# Patient Record
Sex: FEMALE | Race: BLACK OR AFRICAN AMERICAN | Employment: FULL TIME
[De-identification: names, ages, dates, MRNs, and addresses within clinical notes are randomized per-mention and may not be internally consistent; named-entity substitution may affect disease eponyms.]

---

## 2017-01-09 ENCOUNTER — OFFICE VISIT (OUTPATIENT)
Dept: FAMILY MEDICINE CLINIC | Facility: CLINIC | Age: 22
End: 2017-01-09

## 2017-01-09 VITALS
HEART RATE: 89 BPM | DIASTOLIC BLOOD PRESSURE: 75 MMHG | TEMPERATURE: 98.1 F | SYSTOLIC BLOOD PRESSURE: 128 MMHG | BODY MASS INDEX: 37.1 KG/M2 | WEIGHT: 233.6 LBS

## 2017-01-09 DIAGNOSIS — Z32.00 POSSIBLE PREGNANCY: ICD-10-CM

## 2017-01-09 DIAGNOSIS — G43.719 INTRACTABLE CHRONIC MIGRAINE WITHOUT AURA AND WITHOUT STATUS MIGRAINOSUS: Primary | ICD-10-CM

## 2017-01-09 DIAGNOSIS — Z72.51 UNPROTECTED SEX: ICD-10-CM

## 2017-01-09 PROCEDURE — 99213 OFFICE O/P EST LOW 20 MIN: CPT | Performed by: STUDENT IN AN ORGANIZED HEALTH CARE EDUCATION/TRAINING PROGRAM

## 2017-01-09 RX ORDER — SUMATRIPTAN 25 MG/1
25 TABLET, FILM COATED ORAL
Qty: 15 TABLET | Refills: 1 | Status: SHIPPED | OUTPATIENT
Start: 2017-01-09 | End: 2017-11-07

## 2017-01-09 ASSESSMENT — ENCOUNTER SYMPTOMS
SINUS PRESSURE: 0
NAUSEA: 1
ABDOMINAL PAIN: 0
EYE PAIN: 0
FACIAL SWELLING: 0
RHINORRHEA: 0
VOMITING: 0
SHORTNESS OF BREATH: 0
WHEEZING: 0
PHOTOPHOBIA: 0

## 2017-06-07 ENCOUNTER — HOSPITAL ENCOUNTER (EMERGENCY)
Age: 22
Discharge: HOME | End: 2017-06-07
Attending: EMERGENCY MEDICINE
Payer: COMMERCIAL

## 2017-06-07 VITALS
OXYGEN SATURATION: 99 % | WEIGHT: 230 LBS | RESPIRATION RATE: 15 BRPM | DIASTOLIC BLOOD PRESSURE: 65 MMHG | SYSTOLIC BLOOD PRESSURE: 107 MMHG | HEIGHT: 66 IN | BODY MASS INDEX: 36.96 KG/M2 | TEMPERATURE: 98.2 F | HEART RATE: 98 BPM

## 2017-06-07 DIAGNOSIS — B96.89 BACTERIAL VAGINOSIS: ICD-10-CM

## 2017-06-07 DIAGNOSIS — R10.2 SUPRAPUBIC PAIN: Primary | ICD-10-CM

## 2017-06-07 DIAGNOSIS — N76.0 BACTERIAL VAGINOSIS: ICD-10-CM

## 2017-06-07 LAB
-: NORMAL
ABSOLUTE EOS #: 0 K/UL (ref 0–0.4)
ABSOLUTE LYMPH #: 1.5 K/UL (ref 1–4.8)
ABSOLUTE MONO #: 0.6 K/UL (ref 0.1–1.4)
AMORPHOUS: NORMAL
ANION GAP SERPL CALCULATED.3IONS-SCNC: 12 MMOL/L (ref 9–17)
BACTERIA: NORMAL
BASOPHILS # BLD: 1 %
BASOPHILS ABSOLUTE: 0 K/UL (ref 0–0.2)
BILIRUBIN URINE: NEGATIVE
BUN BLDV-MCNC: 12 MG/DL (ref 6–20)
BUN/CREAT BLD: ABNORMAL (ref 9–20)
CALCIUM SERPL-MCNC: 8.9 MG/DL (ref 8.6–10.4)
CASTS UA: NORMAL /LPF (ref 0–8)
CHLORIDE BLD-SCNC: 101 MMOL/L (ref 98–107)
CO2: 22 MMOL/L (ref 20–31)
COLOR: YELLOW
COMMENT UA: ABNORMAL
CREAT SERPL-MCNC: 0.79 MG/DL (ref 0.5–0.9)
CRYSTALS, UA: NORMAL /HPF
DIFFERENTIAL TYPE: ABNORMAL
DIRECT EXAM: ABNORMAL
EOSINOPHILS RELATIVE PERCENT: 1 %
EPITHELIAL CELLS UA: NORMAL /HPF (ref 0–5)
GFR AFRICAN AMERICAN: >60 ML/MIN
GFR NON-AFRICAN AMERICAN: >60 ML/MIN
GFR SERPL CREATININE-BSD FRML MDRD: ABNORMAL ML/MIN/{1.73_M2}
GFR SERPL CREATININE-BSD FRML MDRD: ABNORMAL ML/MIN/{1.73_M2}
GLUCOSE BLD-MCNC: 104 MG/DL (ref 70–99)
GLUCOSE URINE: NEGATIVE
HCG QUALITATIVE: NEGATIVE
HCT VFR BLD CALC: 38.1 % (ref 36–46)
HEMOGLOBIN: 12.5 G/DL (ref 12–16)
KETONES, URINE: ABNORMAL
LEUKOCYTE ESTERASE, URINE: ABNORMAL
LYMPHOCYTES # BLD: 24 %
Lab: ABNORMAL
MCH RBC QN AUTO: 25.3 PG (ref 26–34)
MCHC RBC AUTO-ENTMCNC: 32.7 G/DL (ref 31–37)
MCV RBC AUTO: 77.2 FL (ref 80–100)
MONOCYTES # BLD: 10 %
MUCUS: NORMAL
NITRITE, URINE: NEGATIVE
OTHER OBSERVATIONS UA: NORMAL
PDW BLD-RTO: 14.4 % (ref 12.5–15.4)
PH UA: 7 (ref 5–8)
PLATELET # BLD: 239 K/UL (ref 140–450)
PLATELET ESTIMATE: ABNORMAL
PMV BLD AUTO: 8.8 FL (ref 6–12)
POTASSIUM SERPL-SCNC: 3.9 MMOL/L (ref 3.7–5.3)
PROTEIN UA: NEGATIVE
RBC # BLD: 4.94 M/UL (ref 4–5.2)
RBC # BLD: ABNORMAL 10*6/UL
RBC UA: NORMAL /HPF (ref 0–4)
RENAL EPITHELIAL, UA: NORMAL /HPF
SEG NEUTROPHILS: 64 %
SEGMENTED NEUTROPHILS ABSOLUTE COUNT: 4 K/UL (ref 1.8–7.7)
SODIUM BLD-SCNC: 135 MMOL/L (ref 135–144)
SPECIFIC GRAVITY UA: 1.03 (ref 1–1.03)
SPECIMEN DESCRIPTION: ABNORMAL
STATUS: ABNORMAL
TRICHOMONAS: NORMAL
TURBIDITY: CLEAR
URINE HGB: NEGATIVE
UROBILINOGEN, URINE: NORMAL
WBC # BLD: 6.2 K/UL (ref 4.5–13.5)
WBC # BLD: ABNORMAL 10*3/UL
WBC UA: NORMAL /HPF (ref 0–5)
YEAST: NORMAL

## 2017-06-07 PROCEDURE — 99284 EMERGENCY DEPT VISIT MOD MDM: CPT

## 2017-06-07 PROCEDURE — 81001 URINALYSIS AUTO W/SCOPE: CPT

## 2017-06-07 PROCEDURE — 84703 CHORIONIC GONADOTROPIN ASSAY: CPT

## 2017-06-07 PROCEDURE — 85025 COMPLETE CBC W/AUTO DIFF WBC: CPT

## 2017-06-07 PROCEDURE — 2580000003 HC RX 258: Performed by: EMERGENCY MEDICINE

## 2017-06-07 PROCEDURE — 6360000002 HC RX W HCPCS: Performed by: EMERGENCY MEDICINE

## 2017-06-07 PROCEDURE — 96374 THER/PROPH/DIAG INJ IV PUSH: CPT

## 2017-06-07 PROCEDURE — 87591 N.GONORRHOEAE DNA AMP PROB: CPT

## 2017-06-07 PROCEDURE — 6370000000 HC RX 637 (ALT 250 FOR IP): Performed by: EMERGENCY MEDICINE

## 2017-06-07 PROCEDURE — 87491 CHLMYD TRACH DNA AMP PROBE: CPT

## 2017-06-07 PROCEDURE — 87510 GARDNER VAG DNA DIR PROBE: CPT

## 2017-06-07 PROCEDURE — 87480 CANDIDA DNA DIR PROBE: CPT

## 2017-06-07 PROCEDURE — 80048 BASIC METABOLIC PNL TOTAL CA: CPT

## 2017-06-07 PROCEDURE — 96375 TX/PRO/DX INJ NEW DRUG ADDON: CPT

## 2017-06-07 PROCEDURE — 87660 TRICHOMONAS VAGIN DIR PROBE: CPT

## 2017-06-07 RX ORDER — ONDANSETRON 2 MG/ML
4 INJECTION INTRAMUSCULAR; INTRAVENOUS ONCE
Status: COMPLETED | OUTPATIENT
Start: 2017-06-07 | End: 2017-06-07

## 2017-06-07 RX ORDER — METRONIDAZOLE 500 MG/1
500 TABLET ORAL ONCE
Status: COMPLETED | OUTPATIENT
Start: 2017-06-07 | End: 2017-06-07

## 2017-06-07 RX ORDER — MORPHINE SULFATE 4 MG/ML
4 INJECTION, SOLUTION INTRAMUSCULAR; INTRAVENOUS ONCE
Status: COMPLETED | OUTPATIENT
Start: 2017-06-07 | End: 2017-06-07

## 2017-06-07 RX ORDER — METRONIDAZOLE 500 MG/1
500 TABLET ORAL 2 TIMES DAILY
Qty: 14 TABLET | Refills: 0 | Status: SHIPPED | OUTPATIENT
Start: 2017-06-07 | End: 2017-06-14

## 2017-06-07 RX ORDER — 0.9 % SODIUM CHLORIDE 0.9 %
1000 INTRAVENOUS SOLUTION INTRAVENOUS ONCE
Status: COMPLETED | OUTPATIENT
Start: 2017-06-07 | End: 2017-06-07

## 2017-06-07 RX ADMIN — METRONIDAZOLE 500 MG: 500 TABLET ORAL at 15:23

## 2017-06-07 RX ADMIN — SODIUM CHLORIDE 1000 ML: 9 INJECTION, SOLUTION INTRAVENOUS at 13:10

## 2017-06-07 RX ADMIN — MORPHINE SULFATE 4 MG: 4 INJECTION, SOLUTION INTRAMUSCULAR; INTRAVENOUS at 13:10

## 2017-06-07 RX ADMIN — ONDANSETRON 4 MG: 2 INJECTION INTRAMUSCULAR; INTRAVENOUS at 13:10

## 2017-06-07 ASSESSMENT — ENCOUNTER SYMPTOMS
COUGH: 0
RHINORRHEA: 0
SHORTNESS OF BREATH: 0
ABDOMINAL PAIN: 1
BLOOD IN STOOL: 0
VOMITING: 0
DIARRHEA: 0
CONSTIPATION: 0
SORE THROAT: 0
BACK PAIN: 0
SINUS PRESSURE: 0
TROUBLE SWALLOWING: 0
PHOTOPHOBIA: 0
NAUSEA: 0

## 2017-06-07 ASSESSMENT — PAIN SCALES - GENERAL
PAINLEVEL_OUTOF10: 9
PAINLEVEL_OUTOF10: 7

## 2017-06-07 ASSESSMENT — PAIN DESCRIPTION - PAIN TYPE: TYPE: ACUTE PAIN

## 2017-06-07 ASSESSMENT — PAIN DESCRIPTION - ORIENTATION: ORIENTATION: LOWER;MID

## 2017-06-07 ASSESSMENT — PAIN DESCRIPTION - DESCRIPTORS: DESCRIPTORS: CRAMPING

## 2017-06-07 ASSESSMENT — PAIN DESCRIPTION - LOCATION: LOCATION: ABDOMEN

## 2017-06-07 ASSESSMENT — PAIN DESCRIPTION - FREQUENCY: FREQUENCY: CONTINUOUS

## 2017-06-08 LAB
C TRACH DNA GENITAL QL NAA+PROBE: NEGATIVE
N. GONORRHOEAE DNA: NEGATIVE

## 2017-06-25 ENCOUNTER — HOSPITAL ENCOUNTER (EMERGENCY)
Age: 22
Discharge: HOME | End: 2017-06-25
Attending: EMERGENCY MEDICINE
Payer: COMMERCIAL

## 2017-06-25 VITALS
HEART RATE: 91 BPM | OXYGEN SATURATION: 99 % | TEMPERATURE: 98.1 F | SYSTOLIC BLOOD PRESSURE: 121 MMHG | DIASTOLIC BLOOD PRESSURE: 65 MMHG | RESPIRATION RATE: 18 BRPM

## 2017-06-25 DIAGNOSIS — N93.9 VAGINAL BLEEDING: Primary | ICD-10-CM

## 2017-06-25 LAB
ABSOLUTE EOS #: 0 K/UL (ref 0–0.4)
ABSOLUTE LYMPH #: 1.6 K/UL (ref 1–4.8)
ABSOLUTE MONO #: 0.5 K/UL (ref 0.1–1.4)
ANION GAP SERPL CALCULATED.3IONS-SCNC: 10 MMOL/L (ref 9–17)
BASOPHILS # BLD: 1 %
BASOPHILS ABSOLUTE: 0 K/UL (ref 0–0.2)
BILIRUBIN URINE: NEGATIVE
BUN BLDV-MCNC: 11 MG/DL (ref 6–20)
BUN/CREAT BLD: NORMAL (ref 9–20)
CALCIUM SERPL-MCNC: 8.8 MG/DL (ref 8.6–10.4)
CHLORIDE BLD-SCNC: 104 MMOL/L (ref 98–107)
CO2: 24 MMOL/L (ref 20–31)
COLOR: YELLOW
COMMENT UA: NORMAL
CREAT SERPL-MCNC: 0.56 MG/DL (ref 0.5–0.9)
DIFFERENTIAL TYPE: ABNORMAL
DIRECT EXAM: NORMAL
EOSINOPHILS RELATIVE PERCENT: 1 %
GFR AFRICAN AMERICAN: >60 ML/MIN
GFR NON-AFRICAN AMERICAN: >60 ML/MIN
GFR SERPL CREATININE-BSD FRML MDRD: NORMAL ML/MIN/{1.73_M2}
GFR SERPL CREATININE-BSD FRML MDRD: NORMAL ML/MIN/{1.73_M2}
GLUCOSE BLD-MCNC: 81 MG/DL (ref 70–99)
GLUCOSE URINE: NEGATIVE
HCG QUALITATIVE: NEGATIVE
HCT VFR BLD CALC: 36.2 % (ref 36–46)
HEMOGLOBIN: 11.9 G/DL (ref 12–16)
KETONES, URINE: NEGATIVE
LEUKOCYTE ESTERASE, URINE: NEGATIVE
LYMPHOCYTES # BLD: 28 %
Lab: NORMAL
MCH RBC QN AUTO: 25.4 PG (ref 26–34)
MCHC RBC AUTO-ENTMCNC: 32.9 G/DL (ref 31–37)
MCV RBC AUTO: 77.3 FL (ref 80–100)
MONOCYTES # BLD: 10 %
NITRITE, URINE: NEGATIVE
PDW BLD-RTO: 15.1 % (ref 12.5–15.4)
PH UA: 7 (ref 5–8)
PLATELET # BLD: 254 K/UL (ref 140–450)
PLATELET ESTIMATE: ABNORMAL
PMV BLD AUTO: 8.1 FL (ref 6–12)
POTASSIUM SERPL-SCNC: 4 MMOL/L (ref 3.7–5.3)
PROTEIN UA: NEGATIVE
RBC # BLD: 4.68 M/UL (ref 4–5.2)
RBC # BLD: ABNORMAL 10*6/UL
SEG NEUTROPHILS: 60 %
SEGMENTED NEUTROPHILS ABSOLUTE COUNT: 3.5 K/UL (ref 1.8–7.7)
SODIUM BLD-SCNC: 138 MMOL/L (ref 135–144)
SPECIFIC GRAVITY UA: 1.03 (ref 1–1.03)
SPECIMEN DESCRIPTION: NORMAL
STATUS: NORMAL
TURBIDITY: CLEAR
URINE HGB: NEGATIVE
UROBILINOGEN, URINE: NORMAL
WBC # BLD: 5.7 K/UL (ref 4.5–13.5)
WBC # BLD: ABNORMAL 10*3/UL

## 2017-06-25 PROCEDURE — 80048 BASIC METABOLIC PNL TOTAL CA: CPT

## 2017-06-25 PROCEDURE — 87510 GARDNER VAG DNA DIR PROBE: CPT

## 2017-06-25 PROCEDURE — 81003 URINALYSIS AUTO W/O SCOPE: CPT

## 2017-06-25 PROCEDURE — 87480 CANDIDA DNA DIR PROBE: CPT

## 2017-06-25 PROCEDURE — 87591 N.GONORRHOEAE DNA AMP PROB: CPT

## 2017-06-25 PROCEDURE — 87491 CHLMYD TRACH DNA AMP PROBE: CPT

## 2017-06-25 PROCEDURE — 99284 EMERGENCY DEPT VISIT MOD MDM: CPT

## 2017-06-25 PROCEDURE — 87660 TRICHOMONAS VAGIN DIR PROBE: CPT

## 2017-06-25 PROCEDURE — 85025 COMPLETE CBC W/AUTO DIFF WBC: CPT

## 2017-06-25 PROCEDURE — 84703 CHORIONIC GONADOTROPIN ASSAY: CPT

## 2017-06-25 ASSESSMENT — ENCOUNTER SYMPTOMS
SHORTNESS OF BREATH: 0
NAUSEA: 0
DIARRHEA: 0
SORE THROAT: 0
VOMITING: 0
CONSTIPATION: 0
COUGH: 0
ABDOMINAL PAIN: 1
BACK PAIN: 0

## 2017-06-26 LAB
C TRACH DNA GENITAL QL NAA+PROBE: NEGATIVE
N. GONORRHOEAE DNA: NEGATIVE

## 2017-08-15 ENCOUNTER — HOSPITAL ENCOUNTER (EMERGENCY)
Age: 22
Discharge: HOME | End: 2017-08-15
Attending: EMERGENCY MEDICINE
Payer: COMMERCIAL

## 2017-08-15 ENCOUNTER — APPOINTMENT (OUTPATIENT)
Dept: GENERAL RADIOLOGY | Age: 22
End: 2017-08-15
Payer: COMMERCIAL

## 2017-08-15 VITALS
OXYGEN SATURATION: 100 % | TEMPERATURE: 98.8 F | RESPIRATION RATE: 14 BRPM | SYSTOLIC BLOOD PRESSURE: 117 MMHG | HEART RATE: 91 BPM | DIASTOLIC BLOOD PRESSURE: 79 MMHG

## 2017-08-15 DIAGNOSIS — N93.8 DYSFUNCTIONAL UTERINE BLEEDING: ICD-10-CM

## 2017-08-15 DIAGNOSIS — M25.562 ACUTE PAIN OF LEFT KNEE: Primary | ICD-10-CM

## 2017-08-15 DIAGNOSIS — N30.00 ACUTE CYSTITIS WITHOUT HEMATURIA: ICD-10-CM

## 2017-08-15 LAB
-: ABNORMAL
ABSOLUTE EOS #: 0 K/UL (ref 0–0.4)
ABSOLUTE LYMPH #: 1.1 K/UL (ref 1–4.8)
ABSOLUTE MONO #: 0.5 K/UL (ref 0.1–1.4)
AMORPHOUS: ABNORMAL
ANION GAP SERPL CALCULATED.3IONS-SCNC: 13 MMOL/L (ref 9–17)
BACTERIA: ABNORMAL
BASOPHILS # BLD: 0 %
BASOPHILS ABSOLUTE: 0 K/UL (ref 0–0.2)
BILIRUBIN URINE: ABNORMAL
BUN BLDV-MCNC: 12 MG/DL (ref 6–20)
BUN/CREAT BLD: ABNORMAL (ref 9–20)
CALCIUM SERPL-MCNC: 8.5 MG/DL (ref 8.6–10.4)
CASTS UA: ABNORMAL /LPF (ref 0–8)
CHLORIDE BLD-SCNC: 104 MMOL/L (ref 98–107)
CO2: 22 MMOL/L (ref 20–31)
COLOR: ABNORMAL
CREAT SERPL-MCNC: 0.56 MG/DL (ref 0.5–0.9)
CRYSTALS, UA: ABNORMAL /HPF
DIFFERENTIAL TYPE: ABNORMAL
DIRECT EXAM: NORMAL
EOSINOPHILS RELATIVE PERCENT: 1 %
EPITHELIAL CELLS UA: ABNORMAL /HPF (ref 0–5)
GFR AFRICAN AMERICAN: >60 ML/MIN
GFR NON-AFRICAN AMERICAN: >60 ML/MIN
GFR SERPL CREATININE-BSD FRML MDRD: ABNORMAL ML/MIN/{1.73_M2}
GFR SERPL CREATININE-BSD FRML MDRD: ABNORMAL ML/MIN/{1.73_M2}
GLUCOSE BLD-MCNC: 116 MG/DL (ref 70–99)
GLUCOSE URINE: NEGATIVE
HCG(URINE) PREGNANCY TEST: NEGATIVE
HCT VFR BLD CALC: 36.5 % (ref 36–46)
HEMOGLOBIN: 12.1 G/DL (ref 12–16)
KETONES, URINE: NEGATIVE
LEUKOCYTE ESTERASE, URINE: ABNORMAL
LYMPHOCYTES # BLD: 18 %
Lab: NORMAL
MCH RBC QN AUTO: 25.5 PG (ref 26–34)
MCHC RBC AUTO-ENTMCNC: 33 G/DL (ref 31–37)
MCV RBC AUTO: 77 FL (ref 80–100)
MONOCYTES # BLD: 8 %
MUCUS: ABNORMAL
NITRITE, URINE: POSITIVE
OTHER OBSERVATIONS UA: ABNORMAL
PDW BLD-RTO: 14.5 % (ref 12.5–15.4)
PH UA: 5 (ref 5–8)
PLATELET # BLD: 253 K/UL (ref 140–450)
PLATELET ESTIMATE: ABNORMAL
PMV BLD AUTO: 8.2 FL (ref 6–12)
POTASSIUM SERPL-SCNC: 3.9 MMOL/L (ref 3.7–5.3)
PROTEIN UA: ABNORMAL
RBC # BLD: 4.74 M/UL (ref 4–5.2)
RBC # BLD: ABNORMAL 10*6/UL
RBC UA: ABNORMAL /HPF (ref 0–4)
RENAL EPITHELIAL, UA: ABNORMAL /HPF
SEG NEUTROPHILS: 73 %
SEGMENTED NEUTROPHILS ABSOLUTE COUNT: 4.5 K/UL (ref 1.8–7.7)
SODIUM BLD-SCNC: 139 MMOL/L (ref 135–144)
SPECIFIC GRAVITY UA: 1.03 (ref 1–1.03)
SPECIMEN DESCRIPTION: NORMAL
STATUS: NORMAL
TRICHOMONAS: ABNORMAL
TURBIDITY: ABNORMAL
URINE HGB: ABNORMAL
UROBILINOGEN, URINE: NORMAL
WBC # BLD: 6.1 K/UL (ref 4.5–13.5)
WBC # BLD: ABNORMAL 10*3/UL
WBC UA: ABNORMAL /HPF (ref 0–5)
YEAST: ABNORMAL

## 2017-08-15 PROCEDURE — 85025 COMPLETE CBC W/AUTO DIFF WBC: CPT

## 2017-08-15 PROCEDURE — G0383 LEV 4 HOSP TYPE B ED VISIT: HCPCS

## 2017-08-15 PROCEDURE — 87510 GARDNER VAG DNA DIR PROBE: CPT

## 2017-08-15 PROCEDURE — 87660 TRICHOMONAS VAGIN DIR PROBE: CPT

## 2017-08-15 PROCEDURE — 87480 CANDIDA DNA DIR PROBE: CPT

## 2017-08-15 PROCEDURE — 87491 CHLMYD TRACH DNA AMP PROBE: CPT

## 2017-08-15 PROCEDURE — 73562 X-RAY EXAM OF KNEE 3: CPT

## 2017-08-15 PROCEDURE — 84703 CHORIONIC GONADOTROPIN ASSAY: CPT

## 2017-08-15 PROCEDURE — 81001 URINALYSIS AUTO W/SCOPE: CPT

## 2017-08-15 PROCEDURE — 87591 N.GONORRHOEAE DNA AMP PROB: CPT

## 2017-08-15 PROCEDURE — 80048 BASIC METABOLIC PNL TOTAL CA: CPT

## 2017-08-15 RX ORDER — CEPHALEXIN 500 MG/1
500 CAPSULE ORAL 3 TIMES DAILY
Qty: 21 CAPSULE | Refills: 0 | Status: SHIPPED | OUTPATIENT
Start: 2017-08-15 | End: 2017-08-22

## 2017-08-15 RX ORDER — ACETAMINOPHEN 325 MG/1
650 TABLET ORAL ONCE
Status: DISCONTINUED | OUTPATIENT
Start: 2017-08-15 | End: 2017-08-15 | Stop reason: HOSPADM

## 2017-08-15 ASSESSMENT — PAIN DESCRIPTION - DESCRIPTORS: DESCRIPTORS: ACHING

## 2017-08-15 ASSESSMENT — ENCOUNTER SYMPTOMS
RESPIRATORY NEGATIVE: 1
ALLERGIC/IMMUNOLOGIC NEGATIVE: 1
EYES NEGATIVE: 1
GASTROINTESTINAL NEGATIVE: 1

## 2017-08-15 ASSESSMENT — PAIN DESCRIPTION - ORIENTATION: ORIENTATION: LEFT

## 2017-08-15 ASSESSMENT — PAIN SCALES - GENERAL: PAINLEVEL_OUTOF10: 6

## 2017-08-15 ASSESSMENT — PAIN DESCRIPTION - LOCATION: LOCATION: KNEE

## 2017-08-16 LAB
C TRACH DNA GENITAL QL NAA+PROBE: NEGATIVE
N. GONORRHOEAE DNA: NEGATIVE

## 2017-11-07 ENCOUNTER — OFFICE VISIT (OUTPATIENT)
Dept: FAMILY MEDICINE CLINIC | Age: 22
End: 2017-11-07
Payer: COMMERCIAL

## 2017-11-07 VITALS
WEIGHT: 243.8 LBS | SYSTOLIC BLOOD PRESSURE: 114 MMHG | DIASTOLIC BLOOD PRESSURE: 70 MMHG | TEMPERATURE: 97.9 F | BODY MASS INDEX: 39.35 KG/M2 | HEART RATE: 90 BPM

## 2017-11-07 DIAGNOSIS — I80.01 SUPERFICIAL PHLEBITIS AND THROMBOPHLEBITIS OF RIGHT LOWER EXTREMITY: Primary | ICD-10-CM

## 2017-11-07 PROCEDURE — 99213 OFFICE O/P EST LOW 20 MIN: CPT | Performed by: STUDENT IN AN ORGANIZED HEALTH CARE EDUCATION/TRAINING PROGRAM

## 2017-11-07 PROCEDURE — G8428 CUR MEDS NOT DOCUMENT: HCPCS | Performed by: STUDENT IN AN ORGANIZED HEALTH CARE EDUCATION/TRAINING PROGRAM

## 2017-11-07 PROCEDURE — G8484 FLU IMMUNIZE NO ADMIN: HCPCS | Performed by: STUDENT IN AN ORGANIZED HEALTH CARE EDUCATION/TRAINING PROGRAM

## 2017-11-07 PROCEDURE — 1036F TOBACCO NON-USER: CPT | Performed by: STUDENT IN AN ORGANIZED HEALTH CARE EDUCATION/TRAINING PROGRAM

## 2017-11-07 PROCEDURE — G8417 CALC BMI ABV UP PARAM F/U: HCPCS | Performed by: STUDENT IN AN ORGANIZED HEALTH CARE EDUCATION/TRAINING PROGRAM

## 2017-11-07 RX ORDER — MELOXICAM 15 MG/1
15 TABLET ORAL DAILY
Qty: 30 TABLET | Refills: 3 | Status: SHIPPED | OUTPATIENT
Start: 2017-11-07 | End: 2022-10-21 | Stop reason: ALTCHOICE

## 2017-11-07 ASSESSMENT — ENCOUNTER SYMPTOMS
ABDOMINAL PAIN: 0
SHORTNESS OF BREATH: 0

## 2017-11-07 ASSESSMENT — PATIENT HEALTH QUESTIONNAIRE - PHQ9
SUM OF ALL RESPONSES TO PHQ9 QUESTIONS 1 & 2: 0
SUM OF ALL RESPONSES TO PHQ QUESTIONS 1-9: 0
2. FEELING DOWN, DEPRESSED OR HOPELESS: 0
1. LITTLE INTEREST OR PLEASURE IN DOING THINGS: 0

## 2017-11-07 NOTE — PROGRESS NOTES
08/15/2017    CHOL 134 07/08/2016    TRIG 57 07/08/2016    HDL 29 (L) 07/08/2016    ALT 20 12/21/2016    AST 23 12/21/2016     08/15/2017    K 3.9 08/15/2017     08/15/2017    CREATININE 0.56 08/15/2017    BUN 12 08/15/2017    CO2 22 08/15/2017    TSH 3.74 06/24/2016    LABA1C 5.5 06/24/2016     Lab Results   Component Value Date    CALCIUM 8.5 (L) 08/15/2017     Lab Results   Component Value Date    LDLCHOLESTEROL 94 07/08/2016       Assessment and Plan:    1. Superficial phlebitis and thrombophlebitis of right lower extremity  - acute superficial vein thrombosis. Right short saphenous vein  - Venous doppler in care everywhere  - Information printed  - No need for a/c as very superficial  - NSAID, leg elevation, heat compress, information printed for patient  - If no improvement in symptoms consider vascular referral  - meloxicam (MOBIC) 15 MG tablet; Take 1 tablet by mouth daily  Dispense: 30 tablet; Refill: 3    Requested Prescriptions     Signed Prescriptions Disp Refills    meloxicam (MOBIC) 15 MG tablet 30 tablet 3     Sig: Take 1 tablet by mouth daily       Medications Discontinued During This Encounter   Medication Reason    SUMAtriptan (IMITREX) 25 MG tablet Patient Choice    ondansetron (ZOFRAN) 4 MG tablet Patient Choice    acetaminophen (TYLENOL) 325 MG tablet Patient Choice    meloxicam (MOBIC) 15 MG tablet Reorder       Return if symptoms worsen or fail to improve. Marcelino Russo received counseling on the following healthy behaviors: nutrition, exercise and medication adherence  Reviewed prior labs and health maintenance. Continue current medications, diet and exercise. Discussed use, benefit, and side effects of prescribed medications. Barriers to medication compliance addressed. Patient given educational materials - see patient instructions. All patient questions answered. Patient voiced understanding.

## 2017-11-07 NOTE — PATIENT INSTRUCTIONS
Visit Information    Have you changed or started any medications since your last visit including any over-the-counter medicines, vitamins, or herbal medicines? no   Have you stopped taking any of your medications? Is so, why? -  no  Are you having any side effects from any of your medications? - no    Have you seen any other physician or provider since your last visit?  no   Have you had any other diagnostic tests since your last visit?  no   Have you been seen in the emergency room and/or had an admission in a hospital since we last saw you?  yes - [facility]   Have you had your routine dental cleaning in the past 6 months?  no     Do you have an active MyChart account? If no, what is the barrier? Yes    Patient Care Team:  Lea Smith MD as PCP - General    Medical History Review  Past Medical, Family, and Social History reviewed and does not contribute to the patient presenting condition    Health Maintenance   Topic Date Due    Meningococcal (MCV) Vaccine Age 0-21 Years (1 of 1) 05/16/2012    Cervical cancer screen  05/16/2017    Flu vaccine (1) 09/01/2017    Chlamydia screen  08/15/2018    DTaP/Tdap/Td vaccine (2 - Td) 07/08/2026    Pneumococcal med risk  Completed    HIV screen  Completed             Thank you for letting us take care of you today. We hope all your questions were addressed. If a question was overlooked or something else comes to mind after you return home, please contact a member of your Care Team listed below. Please make sure you have a routine office visit set up to follow-up on [address].      Your Care Team at [place] [address] is Team #3  Delmi Deutsch MD (Faculty)  Lea Smith MD (Faculty  Lan Goyal MD (Resident)  Rebeka Ortega MD (Resident)  Arcadio Rob MD (Resident)  Brennan Underwood MD (Resident)  Sanna Whitlock, KANDACE Rodarte, ALETAA  Phi Aver ([facility])  Laxmi Bautista RN, ([address] [facility])  Colin Stevens, Ph.D.,

## 2017-11-15 PROBLEM — I80.01 SUPERFICIAL PHLEBITIS AND THROMBOPHLEBITIS OF RIGHT LOWER EXTREMITY: Status: ACTIVE | Noted: 2017-11-15

## 2019-01-30 ENCOUNTER — HOSPITAL ENCOUNTER (OUTPATIENT)
Age: 24
Setting detail: SPECIMEN
Discharge: HOME | End: 2019-01-30
Payer: COMMERCIAL

## 2019-01-30 ENCOUNTER — OFFICE VISIT (OUTPATIENT)
Dept: FAMILY MEDICINE CLINIC | Age: 24
End: 2019-01-30
Payer: COMMERCIAL

## 2019-01-30 VITALS
TEMPERATURE: 98 F | DIASTOLIC BLOOD PRESSURE: 76 MMHG | HEART RATE: 89 BPM | HEIGHT: 66 IN | SYSTOLIC BLOOD PRESSURE: 109 MMHG | BODY MASS INDEX: 40.18 KG/M2 | WEIGHT: 250 LBS

## 2019-01-30 DIAGNOSIS — Z23 NEED FOR VACCINATION: ICD-10-CM

## 2019-01-30 DIAGNOSIS — R53.83 FATIGUE, UNSPECIFIED TYPE: ICD-10-CM

## 2019-01-30 DIAGNOSIS — R63.5 WEIGHT GAIN: ICD-10-CM

## 2019-01-30 DIAGNOSIS — G47.19 EXCESSIVE DAYTIME SLEEPINESS: ICD-10-CM

## 2019-01-30 DIAGNOSIS — R53.83 FATIGUE, UNSPECIFIED TYPE: Primary | ICD-10-CM

## 2019-01-30 LAB
ABSOLUTE EOS #: 0.1 K/UL (ref 0–0.44)
ABSOLUTE IMMATURE GRANULOCYTE: <0.03 K/UL (ref 0–0.3)
ABSOLUTE LYMPH #: 1.5 K/UL (ref 1.1–3.7)
ABSOLUTE MONO #: 0.55 K/UL (ref 0.1–1.2)
ANION GAP SERPL CALCULATED.3IONS-SCNC: 11 MMOL/L (ref 9–17)
BASOPHILS # BLD: 0 % (ref 0–2)
BASOPHILS ABSOLUTE: 0.03 K/UL (ref 0–0.2)
BUN BLDV-MCNC: 12 MG/DL (ref 6–20)
BUN/CREAT BLD: ABNORMAL (ref 9–20)
CALCIUM SERPL-MCNC: 8.9 MG/DL (ref 8.6–10.4)
CHLORIDE BLD-SCNC: 109 MMOL/L (ref 98–107)
CO2: 19 MMOL/L (ref 20–31)
CREAT SERPL-MCNC: 0.59 MG/DL (ref 0.5–0.9)
DIFFERENTIAL TYPE: ABNORMAL
EOSINOPHILS RELATIVE PERCENT: 1 % (ref 1–4)
GFR AFRICAN AMERICAN: >60 ML/MIN
GFR NON-AFRICAN AMERICAN: >60 ML/MIN
GFR SERPL CREATININE-BSD FRML MDRD: ABNORMAL ML/MIN/{1.73_M2}
GFR SERPL CREATININE-BSD FRML MDRD: ABNORMAL ML/MIN/{1.73_M2}
GLUCOSE BLD-MCNC: 95 MG/DL (ref 70–99)
HCT VFR BLD CALC: 40.4 % (ref 36.3–47.1)
HEMOGLOBIN: 12.8 G/DL (ref 11.9–15.1)
IMMATURE GRANULOCYTES: 0 %
LYMPHOCYTES # BLD: 21 % (ref 24–43)
MCH RBC QN AUTO: 24.2 PG (ref 25.2–33.5)
MCHC RBC AUTO-ENTMCNC: 31.7 G/DL (ref 28.4–34.8)
MCV RBC AUTO: 76.5 FL (ref 82.6–102.9)
MONOCYTES # BLD: 8 % (ref 3–12)
NRBC AUTOMATED: 0 PER 100 WBC
PDW BLD-RTO: 15.3 % (ref 11.8–14.4)
PLATELET # BLD: 321 K/UL (ref 138–453)
PLATELET ESTIMATE: ABNORMAL
PMV BLD AUTO: 11 FL (ref 8.1–13.5)
POTASSIUM SERPL-SCNC: 3.9 MMOL/L (ref 3.7–5.3)
RBC # BLD: 5.28 M/UL (ref 3.95–5.11)
RBC # BLD: ABNORMAL 10*6/UL
SEG NEUTROPHILS: 70 % (ref 36–65)
SEGMENTED NEUTROPHILS ABSOLUTE COUNT: 5.11 K/UL (ref 1.5–8.1)
SODIUM BLD-SCNC: 139 MMOL/L (ref 135–144)
TSH SERPL DL<=0.05 MIU/L-ACNC: 2.63 MIU/L (ref 0.3–5)
VITAMIN D 25-HYDROXY: 9.5 NG/ML (ref 30–100)
WBC # BLD: 7.3 K/UL (ref 3.5–11.3)
WBC # BLD: ABNORMAL 10*3/UL

## 2019-01-30 PROCEDURE — 99213 OFFICE O/P EST LOW 20 MIN: CPT | Performed by: FAMILY MEDICINE

## 2019-01-30 PROCEDURE — 1036F TOBACCO NON-USER: CPT | Performed by: FAMILY MEDICINE

## 2019-01-30 PROCEDURE — G8417 CALC BMI ABV UP PARAM F/U: HCPCS | Performed by: FAMILY MEDICINE

## 2019-01-30 PROCEDURE — G8427 DOCREV CUR MEDS BY ELIG CLIN: HCPCS | Performed by: FAMILY MEDICINE

## 2019-01-30 PROCEDURE — G8482 FLU IMMUNIZE ORDER/ADMIN: HCPCS | Performed by: FAMILY MEDICINE

## 2019-01-30 PROCEDURE — 90686 IIV4 VACC NO PRSV 0.5 ML IM: CPT | Performed by: FAMILY MEDICINE

## 2019-01-30 ASSESSMENT — ENCOUNTER SYMPTOMS
ABDOMINAL PAIN: 0
VOMITING: 0
DIARRHEA: 0
NAUSEA: 0
SHORTNESS OF BREATH: 0
COUGH: 0
BACK PAIN: 0
SORE THROAT: 0
CONSTIPATION: 0

## 2019-01-31 ENCOUNTER — TELEPHONE (OUTPATIENT)
Dept: FAMILY MEDICINE CLINIC | Age: 24
End: 2019-01-31

## 2019-01-31 DIAGNOSIS — E55.9 VITAMIN D DEFICIENCY: Primary | ICD-10-CM

## 2019-09-19 ENCOUNTER — OFFICE VISIT (OUTPATIENT)
Dept: FAMILY MEDICINE CLINIC | Age: 24
End: 2019-09-19
Payer: COMMERCIAL

## 2019-09-19 VITALS
SYSTOLIC BLOOD PRESSURE: 113 MMHG | WEIGHT: 245 LBS | BODY MASS INDEX: 39.37 KG/M2 | HEIGHT: 66 IN | DIASTOLIC BLOOD PRESSURE: 71 MMHG | HEART RATE: 89 BPM

## 2019-09-19 DIAGNOSIS — J45.30 MILD PERSISTENT ASTHMA WITHOUT COMPLICATION: Primary | ICD-10-CM

## 2019-09-19 DIAGNOSIS — R10.33 PERIUMBILICAL ABDOMINAL PAIN: ICD-10-CM

## 2019-09-19 DIAGNOSIS — Z23 NEED FOR VACCINATION: ICD-10-CM

## 2019-09-19 PROBLEM — J45.20 INTERMITTENT ASTHMA: Status: ACTIVE | Noted: 2019-09-19

## 2019-09-19 PROCEDURE — 90651 9VHPV VACCINE 2/3 DOSE IM: CPT | Performed by: FAMILY MEDICINE

## 2019-09-19 PROCEDURE — G8417 CALC BMI ABV UP PARAM F/U: HCPCS | Performed by: STUDENT IN AN ORGANIZED HEALTH CARE EDUCATION/TRAINING PROGRAM

## 2019-09-19 PROCEDURE — 1036F TOBACCO NON-USER: CPT | Performed by: STUDENT IN AN ORGANIZED HEALTH CARE EDUCATION/TRAINING PROGRAM

## 2019-09-19 PROCEDURE — G8427 DOCREV CUR MEDS BY ELIG CLIN: HCPCS | Performed by: STUDENT IN AN ORGANIZED HEALTH CARE EDUCATION/TRAINING PROGRAM

## 2019-09-19 PROCEDURE — G0008 ADMIN INFLUENZA VIRUS VAC: HCPCS | Performed by: FAMILY MEDICINE

## 2019-09-19 PROCEDURE — 99213 OFFICE O/P EST LOW 20 MIN: CPT | Performed by: STUDENT IN AN ORGANIZED HEALTH CARE EDUCATION/TRAINING PROGRAM

## 2019-09-19 RX ORDER — ALBUTEROL SULFATE 90 UG/1
2 AEROSOL, METERED RESPIRATORY (INHALATION) EVERY 6 HOURS PRN
Qty: 1 INHALER | Refills: 5 | Status: SHIPPED | OUTPATIENT
Start: 2019-09-19 | End: 2022-10-21 | Stop reason: SDUPTHER

## 2019-09-19 ASSESSMENT — ENCOUNTER SYMPTOMS
VOMITING: 0
NAUSEA: 0
CONSTIPATION: 1
COUGH: 1
ABDOMINAL PAIN: 1

## 2019-09-19 ASSESSMENT — PATIENT HEALTH QUESTIONNAIRE - PHQ9
SUM OF ALL RESPONSES TO PHQ QUESTIONS 1-9: 0
SUM OF ALL RESPONSES TO PHQ9 QUESTIONS 1 & 2: 0
SUM OF ALL RESPONSES TO PHQ QUESTIONS 1-9: 0
1. LITTLE INTEREST OR PLEASURE IN DOING THINGS: 0
2. FEELING DOWN, DEPRESSED OR HOPELESS: 0

## 2019-09-19 NOTE — PROGRESS NOTES
Subjective:    Trudy Du is a 22 y.o. female with  has a past medical history of Asthma. Presented to the office today for:  Chief Complaint   Patient presents with    Hernia     pain when hernia was repaired    Shortness of Breath       HPI    22year old female with PMHx of asthma, ventral hernia repair presents today with complaint of a chronic complaint of periumbilical abdominal pain. Patient stated that she had a ventral hernia repair 2 years ago and since approximately 1 year ago, patient gets intermittent abdominal pain. Patient stated pain is around the umbilicus, stabbing in quality and gets worse on exertion. Patient stated she has gone to the [facility] ED multiple times for this abdominal pain, last visit 1 month ago where they did a CT scan and found \"fat around it\". Patient was scheduled to see a surgeon but patient missed the appointment. Patient also complained of SOB that occurs on exertion and when she is laying down. Patient stated she currently does not use any inhalers however albuterol used to help her SOB in the past. Patient also currently has a URI. Review of Systems   Constitutional: Negative for fever. Respiratory: Positive for cough. Cardiovascular: Negative for chest pain. Gastrointestinal: Positive for abdominal pain and constipation. Negative for nausea and vomiting. The patient has a No family history on file. Objective:    /71 (Site: Right Upper Arm, Position: Sitting, Cuff Size: Large Adult) Comment: machine  Pulse 89   Ht 5' 5.98\" (1.676 m)   Wt 245 lb (111.1 kg)   BMI 39.56 kg/m²    BP Readings from Last 3 Encounters:   09/19/19 113/71   01/30/19 109/76   11/07/17 114/70       Physical Exam   Constitutional: She appears well-developed and well-nourished. HENT:   Head: Normocephalic and atraumatic. Cardiovascular: Normal rate, regular rhythm, normal heart sounds and intact distal pulses.    Pulmonary/Chest: Effort normal and breath sounds voice-recognition software. This may cause typographical errors occasionally. Although all effort is made to fix these errors, please do not hesitate to contact our office if there Lili Ken concern with the understanding of this note.

## 2019-09-25 ENCOUNTER — OFFICE VISIT (OUTPATIENT)
Dept: SURGERY | Age: 24
End: 2019-09-25
Payer: COMMERCIAL

## 2019-09-25 VITALS
HEIGHT: 66 IN | BODY MASS INDEX: 39.73 KG/M2 | TEMPERATURE: 97.5 F | WEIGHT: 247.2 LBS | SYSTOLIC BLOOD PRESSURE: 111 MMHG | HEART RATE: 86 BPM | DIASTOLIC BLOOD PRESSURE: 67 MMHG

## 2019-09-25 DIAGNOSIS — K43.9 VENTRAL HERNIA WITHOUT OBSTRUCTION OR GANGRENE: Primary | ICD-10-CM

## 2019-09-25 PROCEDURE — 99202 OFFICE O/P NEW SF 15 MIN: CPT | Performed by: SURGERY

## 2019-09-25 PROCEDURE — 1036F TOBACCO NON-USER: CPT | Performed by: SURGERY

## 2019-09-25 PROCEDURE — G8417 CALC BMI ABV UP PARAM F/U: HCPCS | Performed by: SURGERY

## 2019-09-25 PROCEDURE — G8427 DOCREV CUR MEDS BY ELIG CLIN: HCPCS | Performed by: SURGERY

## 2019-10-11 ENCOUNTER — TELEPHONE (OUTPATIENT)
Dept: FAMILY MEDICINE CLINIC | Age: 24
End: 2019-10-11

## 2022-09-21 ENCOUNTER — HOSPITAL ENCOUNTER (EMERGENCY)
Age: 27
Discharge: HOME | End: 2022-09-21
Attending: EMERGENCY MEDICINE
Payer: MEDICAID

## 2022-09-21 ENCOUNTER — APPOINTMENT (OUTPATIENT)
Dept: GENERAL RADIOLOGY | Age: 27
End: 2022-09-21
Payer: MEDICAID

## 2022-09-21 VITALS
OXYGEN SATURATION: 100 % | SYSTOLIC BLOOD PRESSURE: 110 MMHG | HEART RATE: 80 BPM | DIASTOLIC BLOOD PRESSURE: 67 MMHG | RESPIRATION RATE: 16 BRPM | TEMPERATURE: 98.6 F
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
## 2022-09-21 LAB
CHP ED QC CHECK: NORMAL
PREGNANCY TEST URINE, POC: NEGATIVE

## 2022-09-21 PROCEDURE — 71045 X-RAY EXAM CHEST 1 VIEW: CPT

## 2022-09-21 PROCEDURE — 6360000002 HC RX W HCPCS: Performed by: STUDENT IN AN ORGANIZED HEALTH CARE EDUCATION/TRAINING PROGRAM

## 2022-09-21 PROCEDURE — 73080 X-RAY EXAM OF ELBOW: CPT

## 2022-09-21 PROCEDURE — 6370000000 HC RX 637 (ALT 250 FOR IP): Performed by: STUDENT IN AN ORGANIZED HEALTH CARE EDUCATION/TRAINING PROGRAM

## 2022-09-21 PROCEDURE — 2500000003 HC RX 250 WO HCPCS: Performed by: STUDENT IN AN ORGANIZED HEALTH CARE EDUCATION/TRAINING PROGRAM

## 2022-09-21 PROCEDURE — 73060 X-RAY EXAM OF HUMERUS: CPT

## 2022-09-21 PROCEDURE — 96372 THER/PROPH/DIAG INJ SC/IM: CPT

## 2022-09-21 PROCEDURE — 99284 EMERGENCY DEPT VISIT MOD MDM: CPT

## 2022-09-21 PROCEDURE — 73030 X-RAY EXAM OF SHOULDER: CPT

## 2022-09-21 RX ORDER — CYCLOBENZAPRINE HCL 5 MG
5 TABLET ORAL 2 TIMES DAILY PRN
Qty: 10 TABLET | Refills: 0 | Status: SHIPPED | OUTPATIENT
Start: 2022-09-21 | End: 2022-10-01

## 2022-09-21 RX ORDER — PROPARACAINE HYDROCHLORIDE 5 MG/ML
1 SOLUTION/ DROPS OPHTHALMIC ONCE
Status: COMPLETED | OUTPATIENT
Start: 2022-09-21 | End: 2022-09-21

## 2022-09-21 RX ORDER — KETOROLAC TROMETHAMINE 30 MG/ML
30 INJECTION, SOLUTION INTRAMUSCULAR; INTRAVENOUS ONCE
Status: COMPLETED | OUTPATIENT
Start: 2022-09-21 | End: 2022-09-21

## 2022-09-21 RX ORDER — ORPHENADRINE CITRATE 30 MG/ML
60 INJECTION INTRAMUSCULAR; INTRAVENOUS ONCE
Status: COMPLETED | OUTPATIENT
Start: 2022-09-21 | End: 2022-09-21

## 2022-09-21 RX ADMIN — ORPHENADRINE CITRATE 60 MG: 30 INJECTION INTRAMUSCULAR; INTRAVENOUS at 20:07

## 2022-09-21 RX ADMIN — PROPARACAINE HYDROCHLORIDE 1 DROP: 5 SOLUTION/ DROPS OPHTHALMIC at 20:06

## 2022-09-21 RX ADMIN — KETOROLAC TROMETHAMINE 30 MG: 30 INJECTION, SOLUTION INTRAMUSCULAR; INTRAVENOUS at 20:07

## 2022-09-21 RX ADMIN — FLUORESCEIN SODIUM 1 MG: 1 STRIP OPHTHALMIC at 20:06

## 2022-09-21 ASSESSMENT — ENCOUNTER SYMPTOMS
EYE PAIN: 0
COUGH: 0
VOMITING: 0
NAUSEA: 0
SHORTNESS OF BREATH: 0
ABDOMINAL PAIN: 0

## 2022-09-21 ASSESSMENT — PAIN - FUNCTIONAL ASSESSMENT: PAIN_FUNCTIONAL_ASSESSMENT: 0-10

## 2022-09-21 ASSESSMENT — PAIN SCALES - GENERAL: PAINLEVEL_OUTOF10: 4
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
## 2022-09-27 ENCOUNTER — HOSPITAL ENCOUNTER (EMERGENCY)
Age: 27
Discharge: HOME | End: 2022-09-27
Attending: EMERGENCY MEDICINE
Payer: MEDICAID

## 2022-09-27 ENCOUNTER — APPOINTMENT (OUTPATIENT)
Dept: CT IMAGING | Age: 27
End: 2022-09-27
Payer: MEDICAID

## 2022-09-27 VITALS
DIASTOLIC BLOOD PRESSURE: 77 MMHG | OXYGEN SATURATION: 99 % | HEIGHT: 67 IN | HEART RATE: 88 BPM | WEIGHT: 245 LBS | SYSTOLIC BLOOD PRESSURE: 131 MMHG | TEMPERATURE: 97 F | RESPIRATION RATE: 18 BRPM | BODY MASS INDEX: 38.45 KG/M2

## 2022-09-27 DIAGNOSIS — G44.89 OTHER HEADACHE SYNDROME: Primary | ICD-10-CM

## 2022-09-27 PROCEDURE — 70450 CT HEAD/BRAIN W/O DYE: CPT

## 2022-09-27 PROCEDURE — 96374 THER/PROPH/DIAG INJ IV PUSH: CPT

## 2022-09-27 PROCEDURE — 96375 TX/PRO/DX INJ NEW DRUG ADDON: CPT

## 2022-09-27 PROCEDURE — 2580000003 HC RX 258

## 2022-09-27 PROCEDURE — 6360000002 HC RX W HCPCS

## 2022-09-27 PROCEDURE — 99284 EMERGENCY DEPT VISIT MOD MDM: CPT

## 2022-09-27 RX ORDER — KETOROLAC TROMETHAMINE 30 MG/ML
30 INJECTION, SOLUTION INTRAMUSCULAR; INTRAVENOUS ONCE
Status: COMPLETED | OUTPATIENT
Start: 2022-09-27 | End: 2022-09-27

## 2022-09-27 RX ORDER — PROCHLORPERAZINE EDISYLATE 5 MG/ML
10 INJECTION INTRAMUSCULAR; INTRAVENOUS ONCE
Status: COMPLETED | OUTPATIENT
Start: 2022-09-27 | End: 2022-09-27

## 2022-09-27 RX ORDER — 0.9 % SODIUM CHLORIDE 0.9 %
1000 INTRAVENOUS SOLUTION INTRAVENOUS ONCE
Status: COMPLETED | OUTPATIENT
Start: 2022-09-27 | End: 2022-09-27

## 2022-09-27 RX ORDER — DIPHENHYDRAMINE HYDROCHLORIDE 50 MG/ML
12.5 INJECTION INTRAMUSCULAR; INTRAVENOUS ONCE
Status: COMPLETED | OUTPATIENT
Start: 2022-09-27 | End: 2022-09-27

## 2022-09-27 RX ADMIN — PROCHLORPERAZINE EDISYLATE 10 MG: 5 INJECTION INTRAMUSCULAR; INTRAVENOUS at 08:07

## 2022-09-27 RX ADMIN — DIPHENHYDRAMINE HYDROCHLORIDE 12.5 MG: 50 INJECTION, SOLUTION INTRAMUSCULAR; INTRAVENOUS at 08:06

## 2022-09-27 RX ADMIN — SODIUM CHLORIDE 1000 ML: 9 INJECTION, SOLUTION INTRAVENOUS at 08:22

## 2022-09-27 RX ADMIN — KETOROLAC TROMETHAMINE 30 MG: 30 INJECTION, SOLUTION INTRAMUSCULAR at 08:07

## 2022-09-27 ASSESSMENT — PAIN SCALES - GENERAL: PAINLEVEL_OUTOF10: 10

## 2022-09-27 ASSESSMENT — PAIN DESCRIPTION - LOCATION: LOCATION: HEAD;ARM

## 2022-09-27 ASSESSMENT — PAIN DESCRIPTION - DESCRIPTORS: DESCRIPTORS: ACHING

## 2022-09-27 ASSESSMENT — PAIN - FUNCTIONAL ASSESSMENT: PAIN_FUNCTIONAL_ASSESSMENT: 0-10
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
## 2022-09-27 NOTE — DISCHARGE INSTRUCTIONS
Thank you for visiting [facility] Emergency Department. You need to call Christy Muse MD to make an appointment as directed for follow up. Should you have any questions regarding your care or further treatment, please call [facility] at [phone]. Please return to emergency department for any new or worrisome symptoms including any return of headache, vision changes, numbness, weakness, tingling.

## 2022-10-21 ENCOUNTER — OFFICE VISIT (OUTPATIENT)
Dept: FAMILY MEDICINE CLINIC | Age: 27
End: 2022-10-21
Payer: MEDICAID

## 2022-10-21 VITALS
SYSTOLIC BLOOD PRESSURE: 108 MMHG | BODY MASS INDEX: 39.27 KG/M2 | HEART RATE: 81 BPM | WEIGHT: 250.2 LBS | DIASTOLIC BLOOD PRESSURE: 80 MMHG | TEMPERATURE: 97.5 F | HEIGHT: 67 IN

## 2022-10-21 DIAGNOSIS — G44.309 POST-CONCUSSION HEADACHE: Primary | ICD-10-CM

## 2022-10-21 PROCEDURE — 99213 OFFICE O/P EST LOW 20 MIN: CPT | Performed by: STUDENT IN AN ORGANIZED HEALTH CARE EDUCATION/TRAINING PROGRAM

## 2022-10-21 RX ORDER — ALBUTEROL SULFATE 90 UG/1
2 AEROSOL, METERED RESPIRATORY (INHALATION) EVERY 6 HOURS PRN
Qty: 1 EACH | Refills: 5 | Status: SHIPPED | OUTPATIENT
Start: 2022-10-21

## 2022-10-21 RX ORDER — ACETAMINOPHEN 500 MG
1000 TABLET ORAL 3 TIMES DAILY PRN
Qty: 180 TABLET | Refills: 0 | Status: SHIPPED | OUTPATIENT
Start: 2022-10-21

## 2022-10-21 RX ORDER — IBUPROFEN 800 MG/1
800 TABLET ORAL
Qty: 90 TABLET | Refills: 5 | Status: SHIPPED | OUTPATIENT
Start: 2022-10-21 | End: 2022-10-21

## 2022-10-21 RX ORDER — SUMATRIPTAN 50 MG/1
TABLET, FILM COATED ORAL
Qty: 12 TABLET | Refills: 2 | Status: SHIPPED | OUTPATIENT
Start: 2022-10-21 | End: 2022-10-21

## 2022-10-21 RX ORDER — NAPROXEN 500 MG/1
500 TABLET ORAL 2 TIMES DAILY PRN
Qty: 60 TABLET | Refills: 0 | Status: SHIPPED | OUTPATIENT
Start: 2022-10-21

## 2022-10-21 SDOH — ECONOMIC STABILITY: FOOD INSECURITY: WITHIN THE PAST 12 MONTHS, THE FOOD YOU BOUGHT JUST DIDN'T LAST AND YOU DIDN'T HAVE MONEY TO GET MORE.: NEVER TRUE

## 2022-10-21 SDOH — ECONOMIC STABILITY: FOOD INSECURITY: WITHIN THE PAST 12 MONTHS, YOU WORRIED THAT YOUR FOOD WOULD RUN OUT BEFORE YOU GOT MONEY TO BUY MORE.: NEVER TRUE

## 2022-10-21 ASSESSMENT — PATIENT HEALTH QUESTIONNAIRE - PHQ9
1. LITTLE INTEREST OR PLEASURE IN DOING THINGS: 0
2. FEELING DOWN, DEPRESSED OR HOPELESS: 0
DEPRESSION UNABLE TO ASSESS: PT REFUSES
SUM OF ALL RESPONSES TO PHQ9 QUESTIONS 1 & 2: 0
SUM OF ALL RESPONSES TO PHQ QUESTIONS 1-9: 0

## 2022-10-21 ASSESSMENT — SOCIAL DETERMINANTS OF HEALTH (SDOH): HOW HARD IS IT FOR YOU TO PAY FOR THE VERY BASICS LIKE FOOD, HOUSING, MEDICAL CARE, AND HEATING?: NOT HARD AT ALL
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
## 2022-10-21 NOTE — PATIENT INSTRUCTIONS
Thank you for letting us take care of you today. We hope all your questions were addressed. If a question was overlooked or something else comes to mind after you return home, please contact a member of your Care Team listed below. Your Care Team at [place] [address] is Team #3  Mounika Barahona MD (Faculty)  Oleg Padilla MD (Faculty  Khris Lyon MD (Resident)  Mine Solis (Resident)   Tequila Mujica MD (Resident)  Meche Baker, [place] Albert., [place] Baker., KANDACE Diaz., Joyce Jenkins., Sandra Sheria ([employer] [facility])  Rosendo Morales, Agari (Clinical Practice Manager)  [facility] (Clinical Pharmacist)     Office phone number: [phone]    If you need to get in right away due to illness, please be advised we have \"Same Day\" appointments available Monday-Friday. Please call us at [phone] option #3 to schedule your \"Same Day\" appointment. Thank you for letting us take care of you today. We hope all your questions were addressed. If a question was overlooked or something else comes to mind after you return home, please contact a member of your Care Team listed below. Your Care Team at [place] [address] is Team #3  Mounika Barahona MD (Faculty)  Oleg Padilla MD (Faculty  Khris Lyon MD (Resident)  Mine Solis (Resident)   Tequila Mujica MD (Resident)  Meche Baker, [place] Albert., [place] Baker., KANDACE Diaz., Joyce Jenkins., [facility] ([employer] [facility] [employer])  Rosendo Morales, [address] (Clinical Practice Manager)  [facility] (Clinical Pharmacist)     Office phone number: [phone]    If you need to get in right away due to illness, please be advised we have \"Same Day\" appointments available Monday-Friday. Please call us at [phone] option #3 to schedule your \"Same Day\" appointment.

## 2022-10-21 NOTE — PROGRESS NOTES
Visit Information    Have you changed or started any medications since your last visit including any over-the-counter medicines, vitamins, or herbal medicines? no   Have you stopped taking any of your medications? Is so, why? -  no  Are you having any side effects from any of your medications? - no    Have you seen any other physician or provider since your last visit?  no   Have you had any other diagnostic tests since your last visit? yes - Labs, XR, CT   Have you been seen in the emergency room and/or had an admission in a hospital since we last saw you?  yes - [facility]   Have you had your routine dental cleaning in the past 6 months?  no     Do you have an active MyChart account? If no, what is the barrier?   No: Pending    Patient Care Team:  Christy Muse MD as PCP - General    Medical History Review  Past Medical, Family, and Social History reviewed and does contribute to the patient presenting condition    Health Maintenance   Topic Date Due    COVID-19 Vaccine (1) Never done    Varicella vaccine (1 of 2 - 2-dose childhood series) Never done    Depression Screen  Never done    Hepatitis C screen  Never done    Pap smear  Never done    HPV vaccine (3 - 3-dose series) 01/19/2020    Flu vaccine (1) 08/01/2022    DTaP/Tdap/Td vaccine (4 - Td or Tdap) 04/23/2028    Meningococcal (ACWY) vaccine  Completed    Pneumococcal 0-64 years Vaccine  Completed    HIV screen  Completed    Hepatitis A vaccine  Aged Out    Hib vaccine  Aged Out

## 2022-10-28 ENCOUNTER — TELEMEDICINE (OUTPATIENT)
Dept: FAMILY MEDICINE CLINIC | Age: 27
End: 2022-10-28
Payer: MEDICAID

## 2022-10-28 DIAGNOSIS — R10.33 PERIUMBILICAL ABDOMINAL PAIN: ICD-10-CM

## 2022-10-28 DIAGNOSIS — J06.9 VIRAL URI: Primary | ICD-10-CM

## 2022-10-28 PROCEDURE — 99422 OL DIG E/M SVC 11-20 MIN: CPT | Performed by: STUDENT IN AN ORGANIZED HEALTH CARE EDUCATION/TRAINING PROGRAM

## 2022-10-28 RX ORDER — FLUTICASONE PROPIONATE 50 MCG
2 SPRAY, SUSPENSION (ML) NASAL DAILY
Qty: 16 G | Refills: 0 | Status: SHIPPED | OUTPATIENT
Start: 2022-10-28

## 2022-10-28 ASSESSMENT — PATIENT HEALTH QUESTIONNAIRE - PHQ9
SUM OF ALL RESPONSES TO PHQ9 QUESTIONS 1 & 2: 0
SUM OF ALL RESPONSES TO PHQ QUESTIONS 1-9: 0
1. LITTLE INTEREST OR PLEASURE IN DOING THINGS: 0
2. FEELING DOWN, DEPRESSED OR HOPELESS: 0
SUM OF ALL RESPONSES TO PHQ QUESTIONS 1-9: 0

## 2022-10-28 NOTE — PATIENT INSTRUCTIONS
Thank you for letting us take care of you today. We hope all your questions were addressed. If a question was overlooked or something else comes to mind after you return home, please contact a member of your Care Team listed below. Your Care Team at [place] [address] is Team #3  Paula Norris MD (Faculty)  Javed Cagle MD (Faculty  Jyoti Loredo MD (Resident)  James Anand (Resident)   Ghassan Colón MD (Resident)  Tracy Merchant., [place] Hancock., [place] Perry., KANDACE Ibarra., Lynda Mccloud., Arabella Alfonso ([facility])  Luz Marina Freeman, [facility] (Clinical Practice Manager)  Estiven Dillon [facility] (Clinical Pharmacist)     Office phone number: [phone]    If you need to get in right away due to illness, please be advised we have \"Same Day\" appointments available Monday-Friday. Please call us at [phone] option #3 to schedule your \"Same Day\" appointment.

## 2022-10-28 NOTE — PROGRESS NOTES
Ivy Velasquez is a 33 y.o. female evaluated via telephone on 10/28/2022. Consent:  She and/or health care decision maker is aware that that she may receive a bill for this telephone service, depending on her insurance coverage, and has provided verbal consent to proceed: Yes      Documentation:  I communicated with the patient and/or health care decision maker about: Flulike symptoms    Patient complains of having nasal congestion, mildly productive cough and mild fever going on for last 5-6 days. She has not tried anything so far for this problem. She denies shortness of breath or chest pain. She also reports she has history of periumbilical hernia repair in 2017, now she feels the hernia is coming back. She complains of pain and slight bulge around umbilicus region. She denies nausea, constipation, skin discoloration. Advise provided:  I believe she has viral URI. For her nasal congestion, prescribed Flonase. She is advised to call us back in 3 days if there is no improvement or if her symptoms are worsening. At that time, will prescribe antibiotics. She is in agreement. Ultrasound abdomen is ordered for suspicion of periumbilical hernia recurrence. Patient is advised to call us back if he develops severe abdominal pain, skin discoloration or significant vomiting. She voiced understanding. I affirm this is a Patient Initiated Episode with a Patient who has not had a related appointment within my department in the past 7 days or scheduled within the next 24 hours. Patient identification was verified at the start of the visit: Yes    Total Time: minutes: 11-20 minutes        Ivy Velasquez, was evaluated through a synchronous (real-time) audio-video encounter. The patient (or guardian if applicable) is aware that this is a billable service, which includes applicable co-pays. This Virtual Visit was conducted with patient's (and/or legal guardian's) consent.  The visit was conducted pursuant to the emergency declaration under the [facility], [address] [place] [address] waiver authority and the [employer] General Act. Patient identification was verified, and a caregiver was present when appropriate. The patient was located in a state where the provider was licensed to provide care.         Note: not billable if this call serves to triage the patient into an appointment for the relevant concern      Tangela Brannon MD   Family medicine resident  PGY 3

## 2022-10-28 NOTE — PROGRESS NOTES
Attending Physician Statement  I have discussed the care of Kelsey Fitzgerald 33 y.o. female, including pertinent history and exam findings, with the resident Dr. Gwen Brown MD.    History and Exam:   Chief Complaint   Patient presents with    Migraine     Follow up    Congestion     Cough, congestion for about a week    Hernia     Possible hernia, patient states had surgery back in 2017 for hernia thinks she may have another one       Past Medical History:   Diagnosis Date    Asthma      Allergies   Allergen Reactions    Soma [Carisoprodol] Shortness Of Breath     Per pt report    Banana       I have seen and examined the patient and the key elements of the encounter have been performed by me. BP Readings from Last 3 Encounters:   10/21/22 108/80   09/27/22 131/77   09/21/22 110/67     LMP 10/16/2022 (Approximate)   Lab Results   Component Value Date    WBC 7.3 01/30/2019    HGB 12.8 01/30/2019    HCT 40.4 01/30/2019     01/30/2019    CHOL 134 07/08/2016    TRIG 57 07/08/2016    HDL 29 (L) 07/08/2016    ALT 20 12/21/2016    AST 23 12/21/2016     01/30/2019    K 3.9 01/30/2019     (H) 01/30/2019    CREATININE 0.59 01/30/2019    BUN 12 01/30/2019    CO2 19 (L) 01/30/2019    TSH 2.63 01/30/2019    LABA1C 5.5 06/24/2016     Lab Results   Component Value Date    LABALBU 3.7 12/21/2016     No results found for: IRON, TIBC, FERRITIN  Lab Results   Component Value Date    LDLCHOLESTEROL 94 07/08/2016     I agree with the assessment, plan and the diagnosis of    Diagnosis Orders   1. Viral URI  fluticasone (FLONASE) 50 MCG/ACT nasal spray      2. Periumbilical abdominal pain  US ABDOMEN LIMITED       . I agree with orders as documented by the resident. More than 25 minutes spent  in face to face encounter with the patient and more than half in counseling. Patient's questions were answered. Patient Voiced understanding to the counseling. Return in about 3 days (around 10/31/2022) for viral URI f/u. (GC Modifier)-Dr. Aris Brambila MD

## 2022-10-28 NOTE — PROGRESS NOTES
Visit Information    Have you changed or started any medications since your last visit including any over-the-counter medicines, vitamins, or herbal medicines? no   Are you having any side effects from any of your medications? -  no  Have you stopped taking any of your medications? Is so, why? -  no    Have you seen any other physician or provider since your last visit? No  Have you had any other diagnostic tests since your last visit? No  Have you been seen in the emergency room and/or had an admission to a hospital since we last saw you? No  Have you had your routine dental cleaning in the past 6 months? no    Have you activated your [employer] account? If not, what are your barriers?  No:      Patient Care Team:  Erika Smith MD as PCP - General    Medical History Review  Past Medical, Family, and Social History reviewed and does not contribute to the patient presenting condition    Health Maintenance   Topic Date Due    COVID-19 Vaccine (1) Never done    Varicella vaccine (1 of 2 - 2-dose childhood series) Never done    Hepatitis C screen  Never done    Pap smear  Never done    HPV vaccine (3 - 3-dose series) 01/19/2020    Flu vaccine (1) 08/01/2022    Depression Screen  10/21/2023    DTaP/Tdap/Td vaccine (4 - Td or Tdap) 04/23/2028    Meningococcal (ACWY) vaccine  Completed    Pneumococcal 0-64 years Vaccine  Completed    HIV screen  Completed    Hepatitis A vaccine  Aged Out    Hib vaccine  Aged Out

## 2022-10-31 ENCOUNTER — TELEPHONE (OUTPATIENT)
Dept: FAMILY MEDICINE CLINIC | Age: 27
End: 2022-10-31

## 2022-10-31 NOTE — TELEPHONE ENCOUNTER
Pt stated she did not make the appt for today, stated she does not have an order for an ultrasound, which I printed and will mail to pt.   She also stated that the pharmacy will not  fill her meds because they were prescribed by a resident    [facility] on [address] [facility]

## 2022-11-10 ENCOUNTER — HOSPITAL ENCOUNTER (OUTPATIENT)
Dept: ULTRASOUND IMAGING | Age: 27
Discharge: HOME | End: 2022-11-12
Payer: MEDICAID

## 2022-11-10 DIAGNOSIS — R10.33 PERIUMBILICAL ABDOMINAL PAIN: ICD-10-CM

## 2022-11-10 PROCEDURE — 76705 ECHO EXAM OF ABDOMEN: CPT

## 2022-11-14 DIAGNOSIS — K42.9 PERIUMBILICAL HERNIA: Primary | ICD-10-CM

## 2022-11-14 NOTE — PROGRESS NOTES
Called patient and discussed the results of abdominal ultrasound which showed small periumbilical hernia. Patient had mesh repair surgery done in [facility] in 2017. Then she had recurrence, she was seeing [facility] surgery but could not schedule repeat surgery. She wants to see [facility] surgery at this time.

## 2022-11-23 ENCOUNTER — OFFICE VISIT (OUTPATIENT)
Dept: SURGERY | Age: 27
End: 2022-11-23
Payer: MEDICAID

## 2022-11-23 VITALS
SYSTOLIC BLOOD PRESSURE: 108 MMHG | DIASTOLIC BLOOD PRESSURE: 60 MMHG | TEMPERATURE: 98.3 F | BODY MASS INDEX: 39.3 KG/M2 | HEART RATE: 82 BPM | WEIGHT: 251 LBS

## 2022-11-23 DIAGNOSIS — K43.9 VENTRAL HERNIA WITHOUT OBSTRUCTION OR GANGRENE: Primary | ICD-10-CM

## 2022-11-23 PROCEDURE — 99203 OFFICE O/P NEW LOW 30 MIN: CPT | Performed by: STUDENT IN AN ORGANIZED HEALTH CARE EDUCATION/TRAINING PROGRAM

## 2022-11-23 NOTE — PROGRESS NOTES
History and Physical  [facility]    Patient's Name/Date of Birth: Cesar Whiteside / 1995 (33 y.o.)    Date: November 23, 2022     HPI: Pt is a 33 y.o. female who presents to [facility] for evaluation of a ventral hernia. Patient has a history of an umbilical hernia back in 2016 which was repaired at that time at [address] [place] [address]. Since then she had a another pregnancy in 2017 and after delivery subsequently noticed a another mass in the supraumbilical region. She was last seen in [place] [address] surgery clinic in 2019 at which time we had discussed addressing a fat-containing ventral hernia surgically, but she was lost to follow-up. She presents today reporting that she is having frequent abdominal discomfort associated with the lump that she can palpate just superior to her umbilicus. She denies any associated symptoms including nausea, vomiting, fever, chills, obstipation. She reports a history of significant constipation and only has a bowel movement once or twice a week. She has never been hospitalized for the pain associated with this abdominal mass. She did have an ultrasound done as ordered by her PCP which showed a 12 mm abdominal wall defect and a 3.7 x 0.9 x 3.1 cm fat-containing umbilical hernia in the supraumbilical region. For this reason she was referred to [place] [address] surgery clinic again because she desires surgical repair at this time. Patient is a non-smoker. She reports she does not take anything for constipation. It she denies history of diabetes, hypertension, heart disease. She does have history of asthma. Previous abdominal surgeries include the open umbilical hernia repair and 2 C-sections. She desires more children in the future.       Past Medical History:   Diagnosis Date    Asthma        Past Surgical History:   Procedure Laterality Date    ABDOMINAL HERNIA REPAIR  2017       Current Outpatient Medications   Medication Sig Dispense Refill    fluticasone (FLONASE) 50 MCG/ACT nasal spray 2 sprays by Each Nostril route daily 16 g 0    albuterol sulfate HFA (PROAIR HFA) 108 (90 Base) MCG/ACT inhaler Inhale 2 puffs into the lungs every 6 hours as needed for Wheezing 1 each 5    acetaminophen (TYLENOL) 500 MG tablet Take 2 tablets by mouth 3 times daily as needed for Pain 180 tablet 0    naproxen (NAPROSYN) 500 MG tablet Take 1 tablet by mouth 2 times daily as needed for Pain 60 tablet 0    vitamin D (CHOLECALCIFEROL) 54296 UNIT CAPS Take 1 capsule by mouth once a week 8 capsule 0    Elastic Bandages & Supports (WRIST SPLINT LEFT/RIGHT) MISC 2 kits by Does not apply route daily 2 each 0     No current facility-administered medications for this visit. Allergies   Allergen Reactions    Soma [Carisoprodol] Shortness Of Breath     Per pt report    Banana        No family history on file. Social History     Socioeconomic History    Marital status: Single     Spouse name: Not on file    Number of children: Not on file    Years of education: Not on file    Highest education level: Not on file   Occupational History    Not on file   Tobacco Use    Smoking status: Never    Smokeless tobacco: Never   Substance and Sexual Activity    Alcohol use: No    Drug use: No    Sexual activity: Not on file   Other Topics Concern    Not on file   Social History Narrative    Not on file     Social Determinants of Health     Financial Resource Strain: Low Risk     Difficulty of Paying Living Expenses: Not hard at all   Food Insecurity: No Food Insecurity    Worried About Running Out of Food in the Last Year: Never true    Ran Out of Food in the Last Year: Never true   Transportation Needs: Not on file   Physical Activity: Not on file   Stress: Not on file   Social Connections: Not on file   Intimate Partner Violence: Not on file   Housing Stability: Not on file       ROS:   GEN: Denies recent weight loss, fatigue, fevers, chills. HEENT: No rhinorrhea, dysphagia, odynphagia.    CV: No history of MI, recent chest pain. RESP: Denies shortness of breath, COPD, asthma. GI: Admits abdominal pain  : Denies increased frequency or dysuria. HEM[de-identified] Denies history of anemia or DVTs. ENDO: Denies history of thyroid problems or diabetes. NEURO: Denies history of CVA, TIA. Physical Exam:  Vitals:    11/23/22 1032   BP: 108/60   Pulse: 82   Temp: 98.3 °F (36.8 °C)     General:A & O x3  HEENT:  NCAT, PERRL, EMOI, oral mucus membrane pink and moist, no mass palpated on neck exam  BREAST:Deferred  Heart: S1S2, no mumurs, RRR  Lungs: Unlabored breathing on RA  Abdomen: Obese, soft, nondistended, moderate TTP in the suprapubic region with a small, soft, reducible mass consistent with a supraumbilical hernia repair. Defect size difficult to obtain due to body habitus and patient intolerance of exam.  Scars consistent with previous surgical history  RECTAL: deferred  Extremity: Normal, without deformities, edema, or skin discoloration  SKIN: Skin color, texture, turgor normal. No rashes or lesions. Neuro: CN II-XII grossly intact. No motor or sensory deficits appreciated. MK:normal throughout upper and lower extremities    Assessment      Diagnosis Orders   1. Ventral hernia without obstruction or gangrene  CT ABDOMEN PELVIS W IV CONTRAST Additional Contrast? None          Plan   Examined the patient in person. Discussed the patient, history, labs, imaging with Dr. Valentino Pin  Patient appears to have a supraumbilical mass consistent with hernia. Unknown whether this is a recurrence of her previous umbilical hernia repair or a new ventral hernia. Prior repair per patient report was done with mesh [facility]. Ordered CT scan to further characterize this hernia. Discussed with patient the risks of surgical repair and its high rate of recurrence since she does desire future children. Discussed timing might be better after she has completed her family unit.   Also discussed that a surgical repair may not improve the abdominal pain she is currently having. Currently, the defect appears very small and the hernia sac is currently filled with fat. Patient to follow-up within the next month as convenient after she has completed her CT abdomen/pelvis. At that time we can discuss surgical options and timing. We discussed referring to Dr. Mejia for high risk hernia repairs. Zafar Lopez DO  11/23/2022    Attending Physician Statement  I have discussed the case with Dr Leticia Lewis, including pertinent history and exam findings with the resident. I have seen and examined the patient and the key elements of the encounter have been performed by me. I agree with the assessment, plan and orders as documented by the resident.       Electronically signed by Theresa Nevarez IV, DO  on 11/30/2022 at 10:20 AM

## 2022-11-23 NOTE — PROGRESS NOTES
Visit Information    Have you changed or started any medications since your last visit including any over-the-counter medicines, vitamins, or herbal medicines? no   Have you stopped taking any of your medications? Is so, why? -  no  Are you having any side effects from any of your medications? - no    Have you seen any other physician or provider since your last visit?  no   Have you had any other diagnostic tests since your last visit?  no   Have you been seen in the emergency room and/or had an admission in a hospital since we last saw you?  no   Have you had your routine dental cleaning in the past 6 months?  no     Do you have an active MyChart account? If no, what is the barrier?   No:     Patient Care Team:  Chi Peng MD as PCP - General (Emergency Medicine)    Medical History Review  Past Medical, Family, and Social History reviewed and does not contribute to the patient presenting condition    Health Maintenance   Topic Date Due    COVID-19 Vaccine (1) Never done    Varicella vaccine (1 of 2 - 2-dose childhood series) Never done    Hepatitis C screen  Never done    Pap smear  Never done    HPV vaccine (3 - 3-dose series) 01/19/2020    Flu vaccine (1) 08/01/2022    Depression Screen  10/28/2023    DTaP/Tdap/Td vaccine (4 - Td or Tdap) 04/23/2028    Meningococcal (ACWY) vaccine  Completed    Pneumococcal 0-64 years Vaccine  Completed    HIV screen  Completed    Hepatitis A vaccine  Aged Out    Hib vaccine  Aged Out

## 2022-12-27 ENCOUNTER — HOSPITAL ENCOUNTER (OUTPATIENT)
Dept: CT IMAGING | Age: 27
Discharge: HOME | End: 2022-12-29
Payer: MEDICAID

## 2022-12-27 DIAGNOSIS — K43.9 VENTRAL HERNIA WITHOUT OBSTRUCTION OR GANGRENE: ICD-10-CM

## 2022-12-27 PROCEDURE — A4641 RADIOPHARM DX AGENT NOC: HCPCS | Performed by: STUDENT IN AN ORGANIZED HEALTH CARE EDUCATION/TRAINING PROGRAM

## 2022-12-27 PROCEDURE — 6360000004 HC RX CONTRAST MEDICATION: Performed by: STUDENT IN AN ORGANIZED HEALTH CARE EDUCATION/TRAINING PROGRAM

## 2022-12-27 PROCEDURE — 74177 CT ABD & PELVIS W/CONTRAST: CPT

## 2022-12-27 PROCEDURE — 2580000003 HC RX 258: Performed by: STUDENT IN AN ORGANIZED HEALTH CARE EDUCATION/TRAINING PROGRAM

## 2022-12-27 RX ORDER — 0.9 % SODIUM CHLORIDE 0.9 %
80 INTRAVENOUS SOLUTION INTRAVENOUS ONCE
Status: COMPLETED | OUTPATIENT
Start: 2022-12-27 | End: 2022-12-27

## 2022-12-27 RX ORDER — SODIUM CHLORIDE 0.9 % (FLUSH) 0.9 %
10 SYRINGE (ML) INJECTION ONCE
Status: COMPLETED | OUTPATIENT
Start: 2022-12-27 | End: 2022-12-27

## 2022-12-27 RX ADMIN — SODIUM CHLORIDE 80 ML: 9 INJECTION, SOLUTION INTRAVENOUS at 18:14

## 2022-12-27 RX ADMIN — BARIUM SULFATE 450 ML: 20 SUSPENSION ORAL at 18:15

## 2022-12-27 RX ADMIN — IOPAMIDOL 75 ML: 755 INJECTION, SOLUTION INTRAVENOUS at 18:14

## 2022-12-27 RX ADMIN — SODIUM CHLORIDE, PRESERVATIVE FREE 10 ML: 5 INJECTION INTRAVENOUS at 18:14

## 2023-01-12 ENCOUNTER — TELEPHONE (OUTPATIENT)
Dept: FAMILY MEDICINE CLINIC | Age: 28
End: 2023-01-12

## 2023-01-12 NOTE — TELEPHONE ENCOUNTER
----- Message from April Nils sent at 1/11/2023  2:19 PM EST -----  Subject: Results Request    QUESTIONS  Results: ct abdomen pelvis;  Ordered by:   Date Performed: 2022-11-29  ---------------------------------------------------------------------------  --------------  Daryle Haver INFO    [phone]; OK to leave message on voicemail  ---------------------------------------------------------------------------  --------------

## 2023-02-08 ENCOUNTER — OFFICE VISIT (OUTPATIENT)
Dept: SURGERY | Age: 28
End: 2023-02-08
Payer: MEDICAID

## 2023-02-08 VITALS
DIASTOLIC BLOOD PRESSURE: 65 MMHG | BODY MASS INDEX: 40.24 KG/M2 | HEART RATE: 78 BPM | WEIGHT: 257 LBS | TEMPERATURE: 98.1 F | SYSTOLIC BLOOD PRESSURE: 102 MMHG

## 2023-02-08 DIAGNOSIS — K42.9 RECURRENT UMBILICAL HERNIA: Primary | ICD-10-CM

## 2023-02-08 PROCEDURE — 99212 OFFICE O/P EST SF 10 MIN: CPT | Performed by: STUDENT IN AN ORGANIZED HEALTH CARE EDUCATION/TRAINING PROGRAM

## 2023-02-08 NOTE — PROGRESS NOTES
[facility]   PROGRESS NOTE    DATE: February 8, 2023     SUBJECTIVE:  Cholo Blue is a 33 y.o. female who returns to the [facility] surgery clinic for follow-up imaging review of CT abdomen and pelvis for recurrent umbilical hernia. Recall from previous encounter the patient is a 55-year-old -American female that underwent open umbilical hernia repair with mesh in 2017 at [address] [place] [address]. Patient states she began to feel hernia recurrence within the 3 to 4 months following and noted significant recurrence after her second pregnancy. Patient states that at this point she is having continued pain with activity, bearing down for bowel movements, and any exercise or straining. Patient's pain is accompanied by bulging at the known hernia sites. Patient denies nausea, emesis, changes in bowel or bladder habits, weight loss, shortness of breath, or chest pain. Patient is a non-smoker, denies EtOH, denies illicit drug use. Patient denies anticoagulation or antiplatelet use. Patient's CeDAR post operative complication rate is 71% due to high BMI and presence of recurrent hernia.     Past Medical History:   Diagnosis Date    Asthma        Past Surgical History:   Procedure Laterality Date    ABDOMINAL HERNIA REPAIR  2017       Current Outpatient Medications   Medication Sig Dispense Refill    fluticasone (FLONASE) 50 MCG/ACT nasal spray 2 sprays by Each Nostril route daily 16 g 0    albuterol sulfate HFA (PROAIR HFA) 108 (90 Base) MCG/ACT inhaler Inhale 2 puffs into the lungs every 6 hours as needed for Wheezing 1 each 5    acetaminophen (TYLENOL) 500 MG tablet Take 2 tablets by mouth 3 times daily as needed for Pain 180 tablet 0    naproxen (NAPROSYN) 500 MG tablet Take 1 tablet by mouth 2 times daily as needed for Pain 60 tablet 0    vitamin D (CHOLECALCIFEROL) 19575 UNIT CAPS Take 1 capsule by mouth once a week 8 capsule 0    Elastic Bandages & Supports (WRIST SPLINT LEFT/RIGHT) MISC 2 kits by Does not apply route daily 2 each 0     No current facility-administered medications for this visit. Allergies   Allergen Reactions    Soma [Carisoprodol] Shortness Of Breath     Per pt report    Banana        No family history on file. Social History     Socioeconomic History    Marital status: Single     Spouse name: Not on file    Number of children: Not on file    Years of education: Not on file    Highest education level: Not on file   Occupational History    Not on file   Tobacco Use    Smoking status: Never    Smokeless tobacco: Never   Substance and Sexual Activity    Alcohol use: No    Drug use: No    Sexual activity: Not on file   Other Topics Concern    Not on file   Social History Narrative    Not on file     Social Determinants of Health     Financial Resource Strain: Low Risk     Difficulty of Paying Living Expenses: Not hard at all   Food Insecurity: No Food Insecurity    Worried About Running Out of Food in the Last Year: Never true    Ran Out of Food in the Last Year: Never true   Transportation Needs: Not on file   Physical Activity: Not on file   Stress: Not on file   Social Connections: Not on file   Intimate Partner Violence: Not on file   Housing Stability: Not on file       ROS:  GEN: Denies recent weight loss, fatigue, fevers, chills. HEENT: No rhinorrhea, dysphagia, odynphagia. CV: No history of MI, recent chest pain. RESP: Denies shortness of breath, COPD, asthma. GI: Endorses stabbing periumbilical pain with straining and bowel movements along with palpable bulge  : Denies increased frequency or dysuria. HEM[de-identified] Denies history of anemia or DVTs. ENDO: Denies history of thyroid problems or diabetes. NEURO: Denies history of CVA, TIA. Notes reviewed, and agree with documentation in pt's chart. PHYSICAL EXAM:  Vitals:    02/08/23 1116   BP: 102/65   Pulse: 78   Temp: 98.1 °F (36.7 °C)     GEN: Alert and oriented x 3. In no acute distress. Appears stated age.   HEENT: ISABELLE EOMI  NECK: trachea midline  HEART: Regular rate and rhythm    LUNGS: symmetrical chest rise bilaterally  ABDOMEN: Soft, obese, nondistended, tender to palpation around known hernia defects, hernia is easily reducible but recurs immediately, no overlying skin changes  EXT: no cyanosis, clubbing or edema present    NEURO: no focal deficits, gross motor intact. SKIN: No rashes or nodules noted. IMAGING: No results found. LABS:  @LABRCNT*@      ASSESSMENT:   Diagnosis Orders   1. Recurrent umbilical hernia            PLAN:  Discussed with patient at length about her hernia recurrence and the potential mechanisms leading to this such as obesity and pregnancy. Patient states that she understands risks of recurrence and that she is at higher risk of failure for reoperative procedure or postoperative wound/recovery complications. Patient states she would still like to have the procedure done even though this may not lead to complete resolution of her symptoms of pain. Plan for robotic assisted laparoscopic mesh explantation with the repair of recurrent umbilical hernia. Informed consent obtained, witnessed, and signed. Discussed importance of weight loss and avoidance of pregnancy within 1 year after the surgery to maximize durability of repair. Patient states she has no intention of getting pregnant within the next several years and that she will endeavor to lose weight. Tao Love DO  2/8/2023     Attending Physician Statement  I have discussed the case with Dr Kellie Graf, including pertinent history and exam findings with the resident. I have seen and examined the patient and the key elements of the encounter have been performed by me. I agree with the assessment, plan and orders as documented by the resident.       Electronically signed by Elizabeth Nevarez IV, DO  on 2/15/2023 at 10:54 AM

## 2023-02-08 NOTE — PROGRESS NOTES
Visit Information    Have you changed or started any medications since your last visit including any over-the-counter medicines, vitamins, or herbal medicines? no   Have you stopped taking any of your medications? Is so, why? -  no  Are you having any side effects from any of your medications? - no    Have you seen any other physician or provider since your last visit?  no   Have you had any other diagnostic tests since your last visit?  no   Have you been seen in the emergency room and/or had an admission in a hospital since we last saw you?  no   Have you had your routine dental cleaning in the past 6 months?  no     Do you have an active MyChart account? If no, what is the barrier?   No:     Patient Care Team:  Monica Vides MD as PCP - General (Emergency Medicine)    Medical History Review  Past Medical, Family, and Social History reviewed and does not contribute to the patient presenting condition    Health Maintenance   Topic Date Due    COVID-19 Vaccine (1) Never done    Varicella vaccine (1 of 2 - 2-dose childhood series) Never done    Hepatitis C screen  Never done    Pap smear  Never done    HPV vaccine (3 - 3-dose series) 01/19/2020    Flu vaccine (1) 08/01/2022    Depression Screen  10/28/2023    DTaP/Tdap/Td vaccine (4 - Td or Tdap) 04/23/2028    Meningococcal (ACWY) vaccine  Completed    Pneumococcal 0-64 years Vaccine  Completed    HIV screen  Completed    Hepatitis A vaccine  Aged Out    Hib vaccine  Aged Out

## 2023-02-22 ENCOUNTER — TELEMEDICINE (OUTPATIENT)
Dept: FAMILY MEDICINE CLINIC | Age: 28
End: 2023-02-22
Payer: MEDICAID

## 2023-02-22 DIAGNOSIS — K21.9 GASTROESOPHAGEAL REFLUX DISEASE WITHOUT ESOPHAGITIS: Primary | ICD-10-CM

## 2023-02-22 PROCEDURE — 99211 OFF/OP EST MAY X REQ PHY/QHP: CPT | Performed by: FAMILY MEDICINE

## 2023-02-22 PROCEDURE — 99423 OL DIG E/M SVC 21+ MIN: CPT | Performed by: STUDENT IN AN ORGANIZED HEALTH CARE EDUCATION/TRAINING PROGRAM

## 2023-02-22 RX ORDER — CLINDAMYCIN HYDROCHLORIDE 300 MG/1
CAPSULE ORAL
Status: ON HOLD | COMMUNITY
Start: 2023-02-10 | End: 2023-03-02

## 2023-02-22 RX ORDER — ALBUTEROL SULFATE 90 UG/1
2 AEROSOL, METERED RESPIRATORY (INHALATION) EVERY 6 HOURS PRN
Qty: 1 EACH | Refills: 5 | Status: SHIPPED | OUTPATIENT
Start: 2023-02-22

## 2023-02-22 RX ORDER — OMEPRAZOLE 40 MG/1
40 CAPSULE, DELAYED RELEASE ORAL
Qty: 30 CAPSULE | Refills: 2 | Status: SHIPPED | OUTPATIENT
Start: 2023-02-22

## 2023-02-22 SDOH — ECONOMIC STABILITY: INCOME INSECURITY: HOW HARD IS IT FOR YOU TO PAY FOR THE VERY BASICS LIKE FOOD, HOUSING, MEDICAL CARE, AND HEATING?: NOT VERY HARD

## 2023-02-22 SDOH — ECONOMIC STABILITY: HOUSING INSECURITY
IN THE LAST 12 MONTHS, WAS THERE A TIME WHEN YOU DID NOT HAVE A STEADY PLACE TO SLEEP OR SLEPT IN A SHELTER (INCLUDING NOW)?: NO

## 2023-02-22 SDOH — ECONOMIC STABILITY: FOOD INSECURITY: WITHIN THE PAST 12 MONTHS, YOU WORRIED THAT YOUR FOOD WOULD RUN OUT BEFORE YOU GOT MONEY TO BUY MORE.: NEVER TRUE

## 2023-02-22 SDOH — ECONOMIC STABILITY: FOOD INSECURITY: WITHIN THE PAST 12 MONTHS, THE FOOD YOU BOUGHT JUST DIDN'T LAST AND YOU DIDN'T HAVE MONEY TO GET MORE.: NEVER TRUE

## 2023-02-22 ASSESSMENT — PATIENT HEALTH QUESTIONNAIRE - PHQ9
SUM OF ALL RESPONSES TO PHQ9 QUESTIONS 1 & 2: 0
2. FEELING DOWN, DEPRESSED OR HOPELESS: 0
SUM OF ALL RESPONSES TO PHQ QUESTIONS 1-9: 0
SUM OF ALL RESPONSES TO PHQ QUESTIONS 1-9: 0
1. LITTLE INTEREST OR PLEASURE IN DOING THINGS: 0
SUM OF ALL RESPONSES TO PHQ QUESTIONS 1-9: 0
SUM OF ALL RESPONSES TO PHQ QUESTIONS 1-9: 0

## 2023-02-22 NOTE — PATIENT INSTRUCTIONS
Thank you for letting us take care of you today. We hope all your questions were addressed. If a question was overlooked or something else comes to mind after you return home, please contact a member of your Care Team listed below. Your Care Team at [place] [address] is Team #3  Lazarus Way MD (Faculty)  Clement Whittington MD (Faculty  Laurbroko Maldonado MD (Resident)  Major Ramirez (Resident)   Lisa Singh MD (Resident)  Manolo Moss., [place] Mcneill., [place] Prieto., KANDACE Flowers., Gagan Thomas., Edson Khnana ([address] AR[facility])  Leny Jorgensen, [address] (Clinical Practice Manager)  Kirit Atkinson [facility] (Clinical Pharmacist)     Office phone number: [phone]    If you need to get in right away due to illness, please be advised we have \"Same Day\" appointments available Monday-Friday. Please call us at [phone] option #3 to schedule your \"Same Day\" appointment.

## 2023-02-22 NOTE — PROGRESS NOTES
Visit Information    Have you changed or started any medications since your last visit including any over-the-counter medicines, vitamins, or herbal medicines? no   Have you stopped taking any of your medications? Is so, why? -  no  Are you having any side effects from any of your medications? - no    Have you seen any other physician or provider since your last visit?  no   Have you had any other diagnostic tests since your last visit?  no   Have you been seen in the emergency room and/or had an admission in a hospital since we last saw you?  no   Have you had your routine dental cleaning in the past 6 months?  no     Do you have an active MyChart account? If no, what is the barrier?   Yes    Patient Care Team:  Micah Rashid MD as PCP - General (Emergency Medicine)    Medical History Review  Past Medical, Family, and Social History reviewed and does not contribute to the patient presenting condition    Health Maintenance   Topic Date Due    COVID-19 Vaccine (1) Never done    Varicella vaccine (1 of 2 - 2-dose childhood series) Never done    Hepatitis C screen  Never done    Pap smear  Never done    HPV vaccine (3 - 3-dose series) 01/19/2020    Flu vaccine (1) 08/01/2022    Depression Screen  10/28/2023    DTaP/Tdap/Td vaccine (4 - Td or Tdap) 04/23/2028    Meningococcal (ACWY) vaccine  Completed    Pneumococcal 0-64 years Vaccine  Completed    HIV screen  Completed    Hepatitis A vaccine  Aged Out    Hib vaccine  Aged Out

## 2023-02-22 NOTE — PROGRESS NOTES
Renate Mccloud is a 33 y.o. female evaluated via telephone on 2/22/2023. Consent:  She and/or health care decision maker is aware that that she may receive a bill for this telephone service, depending on her insurance coverage, and has provided verbal consent to proceed: Yes      Documentation:  I communicated with the patient and/or health care decision maker about: Acid reflux    Patient complaining of eosinophilic symptoms that started during her last pregnancy about 2-3 years ago. Then it subsided but now it is flaring up again to the point that she is regurgitating acidic contents. Denies chest pain. Has tried Tums without relief. Has not tried PPIs or H2 blockers. Symptoms progressing. Symptoms severe. Advise provided:  GERD:  We will start on omeprazole 40 mg daily. Educated patient on healthy diet including avoiding spicy food, hot beverages, alcohol, smoking. No meals 2 hours before bedtime. Follow-up in 4 weeks. I affirm this is a Patient Initiated Episode with a Patient who has not had a related appointment within my department in the past 7 days or scheduled within the next 24 hours. Patient identification was verified at the start of the visit: Yes    Total Time: minutes: 11-20 minutes        Renate Mccloud, was evaluated through a synchronous (real-time) audio-video encounter. The patient (or guardian if applicable) is aware that this is a billable service, which includes applicable co-pays. This Virtual Visit was conducted with patient's (and/or legal guardian's) consent. The visit was conducted pursuant to the emergency declaration under the [address] [place] [address], [address] [place] [address] authority and the "[employer] and [employer] General Act. Patient identification was verified, and a caregiver was present when appropriate. The patient was located in a state where the provider was licensed to provide care.         Note: not billable if this call serves to triage the patient into an appointment for the relevant concern      Teddy Nye MD   Family medicine resident  PGY 3

## 2023-02-23 NOTE — PROGRESS NOTES
Attending Physician Statement    Wt Readings from Last 3 Encounters:   02/08/23 257 lb (116.6 kg)   11/23/22 251 lb (113.9 kg)   10/21/22 250 lb 3.2 oz (113.5 kg)     Temp Readings from Last 3 Encounters:   02/08/23 98.1 °F (36.7 °C) (Oral)   11/23/22 98.3 °F (36.8 °C) (Oral)   10/21/22 97.5 °F (36.4 °C) (Oral)     BP Readings from Last 3 Encounters:   02/08/23 102/65   11/23/22 108/60   10/21/22 108/80     Pulse Readings from Last 3 Encounters:   02/08/23 78   11/23/22 82   10/21/22 81         I have discussed the care of Amador Sales, including pertinent history and exam findings with the resident. I have reviewed the key elements of all parts of the encounter with the resident. I agree with the assessment, plan and orders as documented by the resident.   (GE Modifier)

## 2023-03-02 ENCOUNTER — HOSPITAL ENCOUNTER (OUTPATIENT)
Age: 28
Setting detail: OUTPATIENT SURGERY
Discharge: HOME | End: 2023-03-02
Attending: SURGERY | Admitting: SURGERY
Payer: MEDICAID

## 2023-03-02 ENCOUNTER — ANESTHESIA (OUTPATIENT)
Dept: OPERATING ROOM | Age: 28
End: 2023-03-02
Payer: MEDICAID

## 2023-03-02 ENCOUNTER — ANESTHESIA EVENT (OUTPATIENT)
Dept: OPERATING ROOM | Age: 28
End: 2023-03-02
Payer: MEDICAID

## 2023-03-02 VITALS
DIASTOLIC BLOOD PRESSURE: 85 MMHG | BODY MASS INDEX: 40.81 KG/M2 | WEIGHT: 260 LBS | HEART RATE: 94 BPM | OXYGEN SATURATION: 95 % | TEMPERATURE: 97.7 F | RESPIRATION RATE: 20 BRPM | HEIGHT: 67 IN | SYSTOLIC BLOOD PRESSURE: 128 MMHG

## 2023-03-02 DIAGNOSIS — G44.309 POST-CONCUSSION HEADACHE: ICD-10-CM

## 2023-03-02 DIAGNOSIS — G89.18 ACUTE POST-OPERATIVE PAIN: Primary | ICD-10-CM

## 2023-03-02 LAB — HCG, PREGNANCY URINE (POC): NEGATIVE

## 2023-03-02 PROCEDURE — 6360000002 HC RX W HCPCS: Performed by: SURGERY

## 2023-03-02 PROCEDURE — 7100000001 HC PACU RECOVERY - ADDTL 15 MIN: Performed by: SURGERY

## 2023-03-02 PROCEDURE — 7100000011 HC PHASE II RECOVERY - ADDTL 15 MIN: Performed by: SURGERY

## 2023-03-02 PROCEDURE — 3600000009 HC SURGERY ROBOT BASE: Performed by: SURGERY

## 2023-03-02 PROCEDURE — 6360000002 HC RX W HCPCS: Performed by: ANESTHESIOLOGY

## 2023-03-02 PROCEDURE — 2500000003 HC RX 250 WO HCPCS: Performed by: NURSE ANESTHETIST, CERTIFIED REGISTERED

## 2023-03-02 PROCEDURE — 6360000002 HC RX W HCPCS: Performed by: NURSE ANESTHETIST, CERTIFIED REGISTERED

## 2023-03-02 PROCEDURE — 81025 URINE PREGNANCY TEST: CPT

## 2023-03-02 PROCEDURE — S2900 ROBOTIC SURGICAL SYSTEM: HCPCS | Performed by: SURGERY

## 2023-03-02 PROCEDURE — 3700000000 HC ANESTHESIA ATTENDED CARE: Performed by: SURGERY

## 2023-03-02 PROCEDURE — 2580000003 HC RX 258: Performed by: NURSE ANESTHETIST, CERTIFIED REGISTERED

## 2023-03-02 PROCEDURE — 3700000001 HC ADD 15 MINUTES (ANESTHESIA): Performed by: SURGERY

## 2023-03-02 PROCEDURE — 7100000010 HC PHASE II RECOVERY - FIRST 15 MIN: Performed by: SURGERY

## 2023-03-02 PROCEDURE — 2580000003 HC RX 258: Performed by: STUDENT IN AN ORGANIZED HEALTH CARE EDUCATION/TRAINING PROGRAM

## 2023-03-02 PROCEDURE — 3600000019 HC SURGERY ROBOT ADDTL 15MIN: Performed by: SURGERY

## 2023-03-02 PROCEDURE — 7100000000 HC PACU RECOVERY - FIRST 15 MIN: Performed by: SURGERY

## 2023-03-02 PROCEDURE — 2500000003 HC RX 250 WO HCPCS: Performed by: SURGERY

## 2023-03-02 PROCEDURE — 2709999900 HC NON-CHARGEABLE SUPPLY: Performed by: SURGERY

## 2023-03-02 RX ORDER — ONDANSETRON 2 MG/ML
INJECTION INTRAMUSCULAR; INTRAVENOUS PRN
Status: DISCONTINUED | OUTPATIENT
Start: 2023-03-02 | End: 2023-03-02 | Stop reason: SDUPTHER

## 2023-03-02 RX ORDER — SODIUM CHLORIDE, SODIUM LACTATE, POTASSIUM CHLORIDE, CALCIUM CHLORIDE 600; 310; 30; 20 MG/100ML; MG/100ML; MG/100ML; MG/100ML
INJECTION, SOLUTION INTRAVENOUS CONTINUOUS PRN
Status: DISCONTINUED | OUTPATIENT
Start: 2023-03-02 | End: 2023-03-02 | Stop reason: SDUPTHER

## 2023-03-02 RX ORDER — SODIUM CHLORIDE 9 MG/ML
INJECTION, SOLUTION INTRAVENOUS PRN
Status: DISCONTINUED | OUTPATIENT
Start: 2023-03-02 | End: 2023-03-02 | Stop reason: HOSPADM

## 2023-03-02 RX ORDER — MIDAZOLAM HYDROCHLORIDE 1 MG/ML
INJECTION INTRAMUSCULAR; INTRAVENOUS PRN
Status: DISCONTINUED | OUTPATIENT
Start: 2023-03-02 | End: 2023-03-02 | Stop reason: SDUPTHER

## 2023-03-02 RX ORDER — DOCUSATE SODIUM 100 MG/1
100 CAPSULE, LIQUID FILLED ORAL 2 TIMES DAILY PRN
Qty: 20 CAPSULE | Refills: 0 | Status: SHIPPED | OUTPATIENT
Start: 2023-03-02

## 2023-03-02 RX ORDER — BUPIVACAINE HYDROCHLORIDE 2.5 MG/ML
INJECTION, SOLUTION EPIDURAL; INFILTRATION; INTRACAUDAL PRN
Status: DISCONTINUED | OUTPATIENT
Start: 2023-03-02 | End: 2023-03-02 | Stop reason: ALTCHOICE

## 2023-03-02 RX ORDER — PROPOFOL 10 MG/ML
INJECTION, EMULSION INTRAVENOUS PRN
Status: DISCONTINUED | OUTPATIENT
Start: 2023-03-02 | End: 2023-03-02 | Stop reason: SDUPTHER

## 2023-03-02 RX ORDER — METHOCARBAMOL 750 MG/1
750 TABLET, FILM COATED ORAL 4 TIMES DAILY
Qty: 40 TABLET | Refills: 0 | Status: SHIPPED | OUTPATIENT
Start: 2023-03-02 | End: 2023-03-12

## 2023-03-02 RX ORDER — LIDOCAINE HYDROCHLORIDE 10 MG/ML
1 INJECTION, SOLUTION EPIDURAL; INFILTRATION; INTRACAUDAL; PERINEURAL
Status: DISCONTINUED | OUTPATIENT
Start: 2023-03-02 | End: 2023-03-02 | Stop reason: HOSPADM

## 2023-03-02 RX ORDER — FENTANYL CITRATE 50 UG/ML
INJECTION, SOLUTION INTRAMUSCULAR; INTRAVENOUS PRN
Status: DISCONTINUED | OUTPATIENT
Start: 2023-03-02 | End: 2023-03-02 | Stop reason: SDUPTHER

## 2023-03-02 RX ORDER — HYDROMORPHONE HYDROCHLORIDE 1 MG/ML
0.5 INJECTION, SOLUTION INTRAMUSCULAR; INTRAVENOUS; SUBCUTANEOUS EVERY 5 MIN PRN
Status: DISCONTINUED | OUTPATIENT
Start: 2023-03-02 | End: 2023-03-02 | Stop reason: HOSPADM

## 2023-03-02 RX ORDER — SODIUM CHLORIDE 0.9 % (FLUSH) 0.9 %
5-40 SYRINGE (ML) INJECTION PRN
Status: DISCONTINUED | OUTPATIENT
Start: 2023-03-02 | End: 2023-03-02 | Stop reason: HOSPADM

## 2023-03-02 RX ORDER — ONDANSETRON 2 MG/ML
4 INJECTION INTRAMUSCULAR; INTRAVENOUS
Status: COMPLETED | OUTPATIENT
Start: 2023-03-02 | End: 2023-03-02

## 2023-03-02 RX ORDER — LIDOCAINE HYDROCHLORIDE 20 MG/ML
INJECTION, SOLUTION EPIDURAL; INFILTRATION; INTRACAUDAL; PERINEURAL PRN
Status: DISCONTINUED | OUTPATIENT
Start: 2023-03-02 | End: 2023-03-02 | Stop reason: SDUPTHER

## 2023-03-02 RX ORDER — ACETAMINOPHEN 500 MG
500 TABLET ORAL EVERY 4 HOURS
Qty: 180 TABLET | Refills: 0 | Status: SHIPPED | OUTPATIENT
Start: 2023-03-02

## 2023-03-02 RX ORDER — SODIUM CHLORIDE 0.9 % (FLUSH) 0.9 %
5-40 SYRINGE (ML) INJECTION EVERY 12 HOURS SCHEDULED
Status: DISCONTINUED | OUTPATIENT
Start: 2023-03-02 | End: 2023-03-02 | Stop reason: HOSPADM

## 2023-03-02 RX ORDER — FENTANYL CITRATE 50 UG/ML
25 INJECTION, SOLUTION INTRAMUSCULAR; INTRAVENOUS EVERY 5 MIN PRN
Status: DISCONTINUED | OUTPATIENT
Start: 2023-03-02 | End: 2023-03-02 | Stop reason: HOSPADM

## 2023-03-02 RX ORDER — DEXAMETHASONE SODIUM PHOSPHATE 10 MG/ML
INJECTION, SOLUTION INTRAMUSCULAR; INTRAVENOUS PRN
Status: DISCONTINUED | OUTPATIENT
Start: 2023-03-02 | End: 2023-03-02 | Stop reason: SDUPTHER

## 2023-03-02 RX ORDER — ROCURONIUM BROMIDE 10 MG/ML
INJECTION, SOLUTION INTRAVENOUS PRN
Status: DISCONTINUED | OUTPATIENT
Start: 2023-03-02 | End: 2023-03-02 | Stop reason: SDUPTHER

## 2023-03-02 RX ORDER — BUPIVACAINE HYDROCHLORIDE AND EPINEPHRINE 5; 5 MG/ML; UG/ML
INJECTION, SOLUTION EPIDURAL; INTRACAUDAL; PERINEURAL PRN
Status: DISCONTINUED | OUTPATIENT
Start: 2023-03-02 | End: 2023-03-02 | Stop reason: ALTCHOICE

## 2023-03-02 RX ORDER — OXYCODONE HYDROCHLORIDE 5 MG/1
5 TABLET ORAL EVERY 6 HOURS PRN
Qty: 12 TABLET | Refills: 0 | Status: SHIPPED | OUTPATIENT
Start: 2023-03-02 | End: 2023-03-05

## 2023-03-02 RX ORDER — SODIUM CHLORIDE, SODIUM LACTATE, POTASSIUM CHLORIDE, CALCIUM CHLORIDE 600; 310; 30; 20 MG/100ML; MG/100ML; MG/100ML; MG/100ML
INJECTION, SOLUTION INTRAVENOUS CONTINUOUS
Status: DISCONTINUED | OUTPATIENT
Start: 2023-03-02 | End: 2023-03-02 | Stop reason: HOSPADM

## 2023-03-02 RX ORDER — SODIUM CHLORIDE 9 MG/ML
INJECTION, SOLUTION INTRAVENOUS CONTINUOUS
Status: DISCONTINUED | OUTPATIENT
Start: 2023-03-02 | End: 2023-03-02 | Stop reason: HOSPADM

## 2023-03-02 RX ORDER — IBUPROFEN 200 MG
400 TABLET ORAL EVERY 6 HOURS
Qty: 120 TABLET | Refills: 3 | Status: SHIPPED | OUTPATIENT
Start: 2023-03-02

## 2023-03-02 RX ORDER — KETOROLAC TROMETHAMINE 30 MG/ML
INJECTION, SOLUTION INTRAMUSCULAR; INTRAVENOUS PRN
Status: DISCONTINUED | OUTPATIENT
Start: 2023-03-02 | End: 2023-03-02 | Stop reason: SDUPTHER

## 2023-03-02 RX ADMIN — Medication 2000 MG: at 13:44

## 2023-03-02 RX ADMIN — KETOROLAC TROMETHAMINE 30 MG: 30 INJECTION, SOLUTION INTRAMUSCULAR at 15:05

## 2023-03-02 RX ADMIN — MIDAZOLAM 2 MG: 1 INJECTION INTRAMUSCULAR; INTRAVENOUS at 13:29

## 2023-03-02 RX ADMIN — FENTANYL CITRATE 50 MCG: 50 INJECTION, SOLUTION INTRAMUSCULAR; INTRAVENOUS at 14:53

## 2023-03-02 RX ADMIN — HYDROMORPHONE HYDROCHLORIDE 0.5 MG: 1 INJECTION, SOLUTION INTRAMUSCULAR; INTRAVENOUS; SUBCUTANEOUS at 15:55

## 2023-03-02 RX ADMIN — PROPOFOL 160 MG: 10 INJECTION, EMULSION INTRAVENOUS at 13:36

## 2023-03-02 RX ADMIN — SODIUM CHLORIDE, POTASSIUM CHLORIDE, SODIUM LACTATE AND CALCIUM CHLORIDE: 600; 310; 30; 20 INJECTION, SOLUTION INTRAVENOUS at 13:29

## 2023-03-02 RX ADMIN — ROCURONIUM BROMIDE 20 MG: 10 INJECTION, SOLUTION INTRAVENOUS at 14:08

## 2023-03-02 RX ADMIN — ONDANSETRON 4 MG: 2 INJECTION INTRAMUSCULAR; INTRAVENOUS at 15:51

## 2023-03-02 RX ADMIN — FENTANYL CITRATE 50 MCG: 50 INJECTION, SOLUTION INTRAMUSCULAR; INTRAVENOUS at 14:07

## 2023-03-02 RX ADMIN — LIDOCAINE HYDROCHLORIDE 100 MG: 20 INJECTION, SOLUTION EPIDURAL; INFILTRATION; INTRACAUDAL; PERINEURAL at 13:36

## 2023-03-02 RX ADMIN — ONDANSETRON 4 MG: 2 INJECTION INTRAMUSCULAR; INTRAVENOUS at 14:54

## 2023-03-02 RX ADMIN — ROCURONIUM BROMIDE 10 MG: 10 INJECTION, SOLUTION INTRAVENOUS at 14:53

## 2023-03-02 RX ADMIN — ROCURONIUM BROMIDE 50 MG: 10 INJECTION, SOLUTION INTRAVENOUS at 13:36

## 2023-03-02 RX ADMIN — SODIUM CHLORIDE, POTASSIUM CHLORIDE, SODIUM LACTATE AND CALCIUM CHLORIDE: 600; 310; 30; 20 INJECTION, SOLUTION INTRAVENOUS at 12:44

## 2023-03-02 RX ADMIN — SUGAMMADEX 200 MG: 100 INJECTION, SOLUTION INTRAVENOUS at 15:12

## 2023-03-02 RX ADMIN — HYDROMORPHONE HYDROCHLORIDE 0.5 MG: 1 INJECTION, SOLUTION INTRAMUSCULAR; INTRAVENOUS; SUBCUTANEOUS at 15:46

## 2023-03-02 RX ADMIN — FENTANYL CITRATE 100 MCG: 50 INJECTION, SOLUTION INTRAMUSCULAR; INTRAVENOUS at 13:36

## 2023-03-02 RX ADMIN — DEXAMETHASONE SODIUM PHOSPHATE 10 MG: 10 INJECTION, SOLUTION INTRAMUSCULAR; INTRAVENOUS at 13:46

## 2023-03-02 RX ADMIN — FENTANYL CITRATE 50 MCG: 50 INJECTION, SOLUTION INTRAMUSCULAR; INTRAVENOUS at 15:22

## 2023-03-02 RX ADMIN — SODIUM CHLORIDE, POTASSIUM CHLORIDE, SODIUM LACTATE AND CALCIUM CHLORIDE: 600; 310; 30; 20 INJECTION, SOLUTION INTRAVENOUS at 15:22

## 2023-03-02 ASSESSMENT — PAIN DESCRIPTION - DESCRIPTORS
DESCRIPTORS: SHARP

## 2023-03-02 ASSESSMENT — PAIN SCALES - GENERAL
PAINLEVEL_OUTOF10: 8
PAINLEVEL_OUTOF10: 6
PAINLEVEL_OUTOF10: 7

## 2023-03-02 ASSESSMENT — PAIN DESCRIPTION - LOCATION
LOCATION: ABDOMEN

## 2023-03-02 ASSESSMENT — PAIN DESCRIPTION - ORIENTATION
ORIENTATION: LEFT

## 2023-03-02 ASSESSMENT — PAIN - FUNCTIONAL ASSESSMENT: PAIN_FUNCTIONAL_ASSESSMENT: 0-10

## 2023-03-02 NOTE — H&P
History and Physical Update    Pt Name: Lieutenant Aviles  MRN: [id number]  YOB: 1995  Date of evaluation: 3/2/2023      [x] I have reviewed the progress note found in Epic by Dr. Yeimy Lake dated 23 used for an Interval History and Physical note. [x] I have examined Lieutenant Aviles a 33 y.o., female who is scheduled for a HERNIA UMBILICAL REPAIR LAPAROSCOPIC ROBOTIC XI  WITH [facility]PLANTATION MESH [facility] by Dr. Eloisa Waller IV, DO due to Recurrent umbilical hernia [V33.4]. The patient denies health changes since her appointment with Dr. Yeimy Lake on 23. Pt denies fever, chills, productive cough, SOB, chest pain, open sores, rashes, wounds, and history of diabetes. Pt states her asthma is well controlled. Pt denies taking blood thinning medication in the past 10 days. Vital signs: /72   Pulse 78   Temp 98 °F (36.7 °C)   Resp 18   Ht 5' 7\" (1.702 m)   Wt 260 lb (117.9 kg)   LMP 2023   SpO2 100%   BMI 40.72 kg/m²      Allergies:  Soma [carisoprodol] and Banana      Past Medical History:     Past Medical History:   Diagnosis Date    Asthma         Past Surgical History:     Past Surgical History:   Procedure Laterality Date    ABDOMINAL HERNIA REPAIR  2017     SECTION  2018        Social History:     Tobacco:    reports that she has never smoked. She has never used smokeless tobacco.  Alcohol:      reports no history of alcohol use. Drug Use:  reports no history of drug use. Family History:     History reviewed. No pertinent family history. Medications:    Prior to Admission medications    Medication Sig Start Date End Date Taking?  Authorizing Provider   albuterol sulfate HFA (PROAIR HFA) 108 (90 Base) MCG/ACT inhaler Inhale 2 puffs into the lungs every 6 hours as needed for Wheezing 23   Hao Durham MD   omeprazole (PRILOSEC) 40 MG delayed release capsule Take 1 capsule by mouth every morning (before breakfast) 23   Hao Durham MD   fluticasone (FLONASE) 50 MCG/ACT nasal spray 2 sprays by Each Nostril route daily 10/28/22   Kareen Jaime MD   acetaminophen (TYLENOL) 500 MG tablet Take 2 tablets by mouth 3 times daily as needed for Pain 10/21/22   Kareen Jaime MD   vitamin D (CHOLECALCIFEROL) 91379 UNIT CAPS Take 1 capsule by mouth once a week 1/31/19   Lila Medrano MD   Elastic Bandages & Supports (WRIST SPLINT LEFT/RIGHT) MISC 2 kits by Does not apply route daily 7/26/16   Emilie Ziegler MD       This is a 33 y.o. female who is pleasant, cooperative, alert and oriented x 3, in no acute distress. Morbidly obese. Anxious. Heart: Regular rate and rhythm without murmur, gallop, or rub. Lungs: Normal respiratory effort, unlabored, and clear to auscultation without wheezes or rales bilaterally. Abdomen: Nontender, soft, reducible umbilical hernia. Soft, nontender, nondistended with active bowel sounds. Pedal pulses: are palpable bilaterally. Labs:  Recent Labs     03/02/23  1218   HCG NEGATIVE       No results for input(s): COVID19 in the last 720 hours. JORDAN Vigil CNP   Electronically signed 3/2/2023 at 12:35 PM     Sintia Rankin DO   Resident   Specialty:  General Surgery   Progress Notes      Addendum   Encounter Date:  2/8/2023          Related encounter: Office Visit from 2/8/2023 in [address] NOTE     DATE: February 8, 2023      SUBJECTIVE:  Yousif Morin is a 33 y.o. female who returns to the [facility] surgery clinic for follow-up imaging review of CT abdomen and pelvis for recurrent umbilical hernia. Recall from previous encounter the patient is a 31-year-old -American female that underwent open umbilical hernia repair with mesh in 2017 at [address] [place] [address]. Patient states she began to feel hernia recurrence within the 3 to 4 months following and noted significant recurrence after her second pregnancy.        Patient states that at this point she is having continued pain with activity, bearing down for bowel movements, and any exercise or straining.  Patient's pain is accompanied by bulging at the known hernia sites.  Patient denies nausea, emesis, changes in bowel or bladder habits, weight loss, shortness of breath, or chest pain.     Patient is a non-smoker, denies EtOH, denies illicit drug use.  Patient denies anticoagulation or antiplatelet use. Patient's CeDAR post operative complication rate is 30% due to high BMI and presence of recurrent hernia.     Past Medical History        Past Medical History:   Diagnosis Date    Asthma              Past Surgical History         Past Surgical History:   Procedure Laterality Date    ABDOMINAL HERNIA REPAIR   2017            Current Facility-Administered Medications          Current Outpatient Medications   Medication Sig Dispense Refill    fluticasone (FLONASE) 50 MCG/ACT nasal spray 2 sprays by Each Nostril route daily 16 g 0    albuterol sulfate HFA (PROAIR HFA) 108 (90 Base) MCG/ACT inhaler Inhale 2 puffs into the lungs every 6 hours as needed for Wheezing 1 each 5    acetaminophen (TYLENOL) 500 MG tablet Take 2 tablets by mouth 3 times daily as needed for Pain 180 tablet 0    naproxen (NAPROSYN) 500 MG tablet Take 1 tablet by mouth 2 times daily as needed for Pain 60 tablet 0    vitamin D (CHOLECALCIFEROL) 56599 UNIT CAPS Take 1 capsule by mouth once a week 8 capsule 0    Elastic Bandages & Supports (WRIST SPLINT LEFT/RIGHT) MISC 2 kits by Does not apply route daily 2 each 0      No current facility-administered medications for this visit.                  Allergies   Allergen Reactions    Soma [Carisoprodol] Shortness Of Breath       Per pt report    Banana           Family History   No family history on file.        Social History               Socioeconomic History    Marital status: Single       Spouse name: Not on file    Number of children: Not on file    Years of education: Not on file    Highest education level: Not on file  Occupational History    Not on file   Tobacco Use    Smoking status: Never    Smokeless tobacco: Never   Substance and Sexual Activity    Alcohol use: No    Drug use: No    Sexual activity: Not on file   Other Topics Concern    Not on file   Social History Narrative    Not on file      Social Determinants of Health          Financial Resource Strain: Low Risk     Difficulty of Paying Living Expenses: Not hard at all   Food Insecurity: No Food Insecurity    Worried About Running Out of Food in the Last Year: Never true    Ran Out of Food in the Last Year: Never true   Transportation Needs: Not on file   Physical Activity: Not on file   Stress: Not on file   Social Connections: Not on file   Intimate Partner Violence: Not on file   Housing Stability: Not on file            ROS:  GEN: Denies recent weight loss, fatigue, fevers, chills. HEENT: No rhinorrhea, dysphagia, odynphagia. CV: No history of MI, recent chest pain. RESP: Denies shortness of breath, COPD, asthma. GI: Endorses stabbing periumbilical pain with straining and bowel movements along with palpable bulge  : Denies increased frequency or dysuria. HEM[de-identified] Denies history of anemia or DVTs. ENDO: Denies history of thyroid problems or diabetes. NEURO: Denies history of CVA, TIA. Notes reviewed, and agree with documentation in pt's chart. PHYSICAL EXAM:      Vitals:     02/08/23 1116   BP: 102/65   Pulse: 78   Temp: 98.1 °F (36.7 °C)      GEN: Alert and oriented x 3. In no acute distress. Appears stated age. HEENT: PERRLA, EOMI  NECK: trachea midline  HEART: Regular rate and rhythm    LUNGS: symmetrical chest rise bilaterally  ABDOMEN: Soft, obese, nondistended, tender to palpation around known hernia defects, hernia is easily reducible but recurs immediately, no overlying skin changes  EXT: no cyanosis, clubbing or edema present    NEURO: no focal deficits, gross motor intact. SKIN: No rashes or nodules noted. IMAGING: No results found. LABS:  @LABRCNT*@        ASSESSMENT:    Diagnosis Orders   1. Recurrent umbilical hernia                PLAN:  Discussed with patient at length about her hernia recurrence and the potential mechanisms leading to this such as obesity and pregnancy. Patient states that she understands risks of recurrence and that she is at higher risk of failure for reoperative procedure or postoperative wound/recovery complications. Patient states she would still like to have the procedure done even though this may not lead to complete resolution of her symptoms of pain. Plan for robotic assisted laparoscopic mesh explantation with the repair of recurrent umbilical hernia. Informed consent obtained, witnessed, and signed. Discussed importance of weight loss and avoidance of pregnancy within 1 year after the surgery to maximize durability of repair. Patient states she has no intention of getting pregnant within the next several years and that she will endeavor to lose weight. Caro Ocampo DO  2/8/2023     Attending Physician Statement  I have discussed the case with Dr Rocky Feliciano, including pertinent history and exam findings with the resident. I have seen and examined the patient and the key elements of the encounter have been performed by me. I agree with the assessment, plan and orders as documented by the resident.        Electronically signed by Sally Nevarez IV, DO  on 2/15/2023 at 10:54 AM          Revision History

## 2023-03-02 NOTE — OP NOTE
Operative Note      Patient: Sukhwinder Cai  YOB: 1995  MRN: [id number]    Date of Procedure: 3/2/2023    Pre-Op Diagnosis: Recurrent umbilical hernia [B80.5]    Post-Op Diagnosis: Ventral hernia x2    Procedure:  Robotic assisted laparoscopic ventral hernia repair x2, fixation of prior mesh, TAP block     Surgeon(s):  Wade Garibay IV, DO    Assistant:   Resident: Tami Andersen DO; Venita Nazario DO    Anesthesia: General    Estimated Blood Loss (mL): 15    Complications: None    Specimens:   * No specimens in log *    Implants:  * No implants in log *      Drains: * No LDAs found *    Findings: Previously placed mesh secured along the edges, supraumbilical ventral hernia less than 1 cm x 2 repaired primarily    History: Patient is a 23-year-old female who previously underwent open umbilical hernia repair with mesh in 2017 with subsequent recurrence. Patient has been having supraumbilical abdominal pain a few months after the hernia repair and has felt a lump. CT abdomen pelvis performed showed a supraumbilical hernia defect. Decision made to bring the patient to the operating room for robotic assisted laparoscopic ventral hernia repair with possible explantation of mesh and possible placement of new mesh. This procedure, including risks, benefits, alternatives and complications have been fully reviewed with the patient and informed consent was obtained. Risks discussed include infection, bleeding, injury to surrounding structures, need for more procedures, chronic pain, scarring, risks of anesthesia, including myocardial infarction, stroke, fatal arrhythmias. Patient understands and all questions were answered appropriately. Detailed Description of Procedure: The patient was taken to the operative suite, placed in supine position. Time-out was done to verify correct patient and procedure. General anesthesia with endotracheal tube was induced. Preoperative antibiotics were given.  SCDs were placed. The patient's right arm was tucked. The abdomen was prepped and draped in usual sterile fashion. Entrance into the abdomen was gained with a Veress needle placed in the left upper quadrant at Dorsey's point. A saline drop test was used to confirm placement into the abdomen. A pneumoperitoneum was created with a pressure of 15 mmHg. An 8mm port was placed under Optiview technique and entry into the abdominal cavity was obtained. Once this was accomplished the Veress needle was removed. The abdomen was inspected to see if there was any injury during entrance into the abdomen. No injuries were noted. The 2 subsequent ports were then placed under direct visualization. Two 8 mm robotic ports were placed: 1 left lateral to the umbilicus and 1 in the left lower quadrant. The instruments were all placed within the abdominal cavity under direct visualization. There were omental adhesions to the abdominal wall. Once the ports were all placed, the robot was docked without complication. The omental adhesions were carefully lysed with Endoshears off of the abdominal wall and the prior mesh. The inferior portion of the mesh was partway off of the abdominal wall with evidence of small hernias. There additionally was a small less than 1 cm supraumbilical hernia and 2 cm superior to that there was another less than 1 cm ventral hernia. Decision was made to secure the previous mesh to the abdominal wall inferiorly using 0 V-Loc suture. Two additional 0 V-Loc suture was then used to primarily repair the 2 ventral hernias. The instruments were removed and the robot was undocked. A tap block was performed bilaterally under direct visualization. The remaining ports were then removed. The port site skin was reapproximated using 4-0 Monocryl. Marcaine 0.5% was used to anesthetize all incision sites. Dermabond was placed over the incision. The patient tolerated the procedure well. There were no complications. Patient was extubated and taken to recovery in stable condition. All sponge, instrument and needle counts were correct prior to skin closure. Dr. Omar Vazquez was present for the entire procedure.      Electronically signed by Harris Ray DO on 3/2/2023 at 3:21 PM

## 2023-03-02 NOTE — ANESTHESIA PRE PROCEDURE
Department of Anesthesiology  Preprocedure Note       Name:  Amador Sales   Age:  33 y.o.  :  1995                                          MRN:  [id number]         Date:  3/2/2023      Surgeon: Dewayne Florentino):  Christian Nevarez IV, DO    Procedure: Procedure(s): HERNIA UMBILICAL REPAIR LAPAROSCOPIC ROBOTIC XI  WITH EXPLANTATION MESH AND NEW MESH REPAIR    Medications prior to admission:   Prior to Admission medications    Medication Sig Start Date End Date Taking?  Authorizing Provider   albuterol sulfate HFA (PROAIR HFA) 108 (90 Base) MCG/ACT inhaler Inhale 2 puffs into the lungs every 6 hours as needed for Wheezing 23   Rocio Lion MD   omeprazole (PRILOSEC) 40 MG delayed release capsule Take 1 capsule by mouth every morning (before breakfast) 23   Rocio Lion MD   fluticasone (FLONASE) 50 MCG/ACT nasal spray 2 sprays by Each Nostril route daily 10/28/22   Rocio Lion MD   acetaminophen (TYLENOL) 500 MG tablet Take 2 tablets by mouth 3 times daily as needed for Pain 10/21/22   Rocio Lion MD   vitamin D (CHOLECALCIFEROL) 79720 UNIT CAPS Take 1 capsule by mouth once a week 19   Tangela Bejarano MD   Elastic Bandages & Supports (WRIST SPLINT LEFT/RIGHT) MISC 2 kits by Does not apply route daily 16   Ike Cordova MD       Current medications:    Current Facility-Administered Medications   Medication Dose Route Frequency Provider Last Rate Last Admin    lidocaine PF 1 % injection 1 mL  1 mL IntraDERmal Once PRN Yvonne Gomes MD        0.9 % sodium chloride infusion   IntraVENous Continuous Yvonne Gomes MD        lactated ringers IV soln infusion   IntraVENous Continuous Yvonne Gomes  mL/hr at 23 1244 New Bag at 23 1244    sodium chloride flush 0.9 % injection 5-40 mL  5-40 mL IntraVENous 2 times per day Yvonne Gomes MD        sodium chloride flush 0.9 % injection 5-40 mL  5-40 mL IntraVENous PRN Yvonne Gomes MD        0.9 % sodium chloride infusion   IntraVENous PRN Mando Swan MD        bupivacaine-EPINEPHrine PF (MARCAINE-w/EPINEPHRINE) 0.5% -1:[id number] injection    PRN Jose Nevarez IV, DO   11 mL at 03/02/23 1409     Facility-Administered Medications Ordered in Other Encounters   Medication Dose Route Frequency Provider Last Rate Last Admin    midazolam (VERSED) injection   IntraVENous PRN Andreea Saint Joseph, APRN - CRNA   2 mg at 03/02/23 1329    lactated ringers IV soln infusion   IntraVENous Continuous PRN Andreea Saint Joseph, APRN - CRNA   New Bag at 03/02/23 1329    rocuronium (ZEMURON) injection   IntraVENous PRN Andreea Saint Joseph, APRN - CRNA   20 mg at 03/02/23 1408    lidocaine PF 2 % injection   IntraVENous PRN Andreea Saint Joseph, APRN - CRNA   100 mg at 03/02/23 1336    propofol injection   IntraVENous PRN Andreea Saint Joseph, APRN - CRNA   160 mg at 03/02/23 1336    fentaNYL (SUBLIMAZE) injection   IntraVENous PRN Andreea Saint Joseph, APRN - CRNA   50 mcg at 03/02/23 1407    dexamethasone (PF) (DECADRON) injection   IntraVENous PRN Andreea Saint Joseph, APRN - CRNA   10 mg at 03/02/23 1346       Allergies: Allergies   Allergen Reactions    Soma [Carisoprodol] Shortness Of Breath     Per pt report    Banana        Problem List:    Patient Active Problem List   Diagnosis Code    Chronic lower back pain M54.50, G89.29    Allergic rhinitis J30.9    Mild persistent asthma without complication O45.17    Obesity (BMI 30-39. 9) E66.9    Urticaria L50.9    Superficial phlebitis and thrombophlebitis of right lower extremity I80.01    Vitamin D deficiency F15.5    Periumbilical abdominal pain R10.33    Intermittent asthma J45.20    Need for vaccination Z23    Post-concussion headache G44.309    Viral URI X32.0    Periumbilical hernia F51.2    Gastroesophageal reflux disease without esophagitis K21.9       Past Medical History:        Diagnosis Date    Asthma        Past Surgical History:        Procedure Laterality Date    ABDOMINAL HERNIA REPAIR 2017     SECTION  2018       Social History:    Social History     Tobacco Use    Smoking status: Never    Smokeless tobacco: Never   Substance Use Topics    Alcohol use: No                                Counseling given: Not Answered      Vital Signs (Current):   Vitals:    23 1216 23 1227   BP: 120/72    Pulse: 78    Resp: 18    Temp: 98 °F (36.7 °C)    SpO2: 100%    Weight:  260 lb (117.9 kg)   Height:  5' 7\" (1.702 m)                                              BP Readings from Last 3 Encounters:   23 120/72   23 102/65   22 108/60       NPO Status: Time of last liquid consumption:                         Time of last solid consumption:                         Date of last liquid consumption: 23                        Date of last solid food consumption: 23    BMI:   Wt Readings from Last 3 Encounters:   23 260 lb (117.9 kg)   23 257 lb (116.6 kg)   22 251 lb (113.9 kg)     Body mass index is 40.72 kg/m². CBC:   Lab Results   Component Value Date/Time    WBC 7.3 2019 03:40 PM    RBC 5.28 2019 03:40 PM    HGB 12.8 2019 03:40 PM    HCT 40.4 2019 03:40 PM    MCV 76.5 2019 03:40 PM    RDW 15.3 2019 03:40 PM     2019 03:40 PM       CMP:   Lab Results   Component Value Date/Time     2019 03:40 PM    K 3.9 2019 03:40 PM     2019 03:40 PM    CO2 19 2019 03:40 PM    BUN 12 2019 03:40 PM    CREATININE 0.59 2019 03:40 PM    GFRAA >60 2019 03:40 PM    LABGLOM >60 2019 03:40 PM    GLUCOSE 95 2019 03:40 PM    PROT 7.5 2016 12:20 PM    CALCIUM 8.9 2019 03:40 PM    BILITOT 0.57 2016 12:20 PM    ALKPHOS 75 2016 12:20 PM    AST 23 2016 12:20 PM    ALT 20 2016 12:20 PM       POC Tests: No results for input(s): POCGLU, POCNA, POCK, POCCL, POCBUN, POCHEMO, POCHCT in the last 72 hours.     Coags: No results found for: PROTIME, INR, APTT    HCG (If Applicable):   Lab Results   Component Value Date    PREGTESTUR NEGATIVE 09/21/2022    HCG NEGATIVE 03/02/2023    HCGQUANT 30 (H) 01/19/2017        ABGs: No results found for: PHART, PO2ART, RYG6ZSX, XCG2NAZ, BEART, R1SDVRJN     Type & Screen (If Applicable):  No results found for: LABABO, LABRH    Drug/Infectious Status (If Applicable):  No results found for: HIV, HEPCAB    COVID-19 Screening (If Applicable): No results found for: COVID19        Anesthesia Evaluation  Patient summary reviewed and Nursing notes reviewed no history of anesthetic complications:   Airway: Mallampati: II  TM distance: >3 FB   Neck ROM: full  Mouth opening: > = 3 FB   Dental: normal exam         Pulmonary:   (+) asthma:                            Cardiovascular:  Exercise tolerance: good (>4 METS),       (-) past MI, CAD and CABG/stent        Rate: normal                    Neuro/Psych:   (+) headaches:,             GI/Hepatic/Renal:   (+) GERD:,           Endo/Other:                     Abdominal:             Vascular: Other Findings:           Anesthesia Plan      general     ASA 3       Induction: intravenous. MIPS: Prophylactic antiemetics administered. Anesthetic plan and risks discussed with patient. Plan discussed with CRNA.     Attending anesthesiologist reviewed and agrees with Preprocedure content        patient with upper gum piercing she is unable to take it out but consents for me to attempt, I was able to remove it with hemostats,  the piercing was discarded in the trash per patient request.        Aleta Castro DO   3/2/2023

## 2023-03-02 NOTE — DISCHARGE INSTRUCTIONS
Patient Discharge Instructions  Discharge Date:  3/2/2023    HYGEINE: Clyde Caballero to shower 24 hours after surgery, no soaking in a tub/pool until seen at your follow up appointment. Clean incision daily with gentle soap and water, do not use alcohol/peroxide or any harsh cleansers. There is surgical glue over your incision(s), do not pick or peel off the glue - this will naturally fall off in 7-14 days. DRIVING: No driving while taking narcotic medications or while in pain    ACTIVITY: No lifting greater than 10lbs for 6 weeks or any strenuous activity. DIET: Resume your normal diet as advised by your PCP. MEDICATIONS: Take all medications as prescribed. Take medications as prescribed by physician. For adequate pain control, take tylenol and motrin in alternating cycles (e.g. take 500mg tylenol at 8am, take 400mg motrin at 12pm, take 500mg tylenol at 4pm, take 400mg motrin at 8pm, etc.). If prescribed narcotic medications (Norco, Percocet, or Roxicodone), use for breakthrough pain and attempt to wean off medications as soon as possible. Do not take more than 4000mg tylenol in 24 hours. SPECIAL INSTRUCTIONS:     Follow up with Dr. Garrison Sanchez in 10-14 days, call the clinic for appointment. Call sooner if fever above 101.4 degrees Celsius, increase in swelling or redness, thick purulent discharge or pain not controlled with medications.

## 2023-03-02 NOTE — ANESTHESIA POSTPROCEDURE EVALUATION
Department of Anesthesiology  Postprocedure Note    Patient: Sukhwinder Cai  MRN: [id number]  YOB: 1995  Date of evaluation: 3/2/2023      Procedure Summary     Date: 03/02/23 Room / Location: [address] [place] [address] / [facility] - INPATIENT    Anesthesia Start: [address] Anesthesia Stop: 0046    Procedure: HERNIA UMBILICAL REPAIR LAPAROSCOPIC ROBOTIC XI  WITH EXPLANTATION MESH AND NEW MESH REPAIR (Abdomen) Diagnosis:       Recurrent umbilical hernia      (Recurrent umbilical hernia [X94.1])    Surgeons: Federico Nevarez IV, DO Responsible Provider: Lory Marcus DO    Anesthesia Type: general ASA Status: 3          Anesthesia Type: No value filed.     Kelly Phase I: Kelly Score: 8    Kelly Phase II:        Anesthesia Post Evaluation    Patient location during evaluation: PACU  Patient participation: complete - patient participated  Level of consciousness: awake and alert  Airway patency: patent  Nausea & Vomiting: no nausea and no vomiting  Complications: no  Cardiovascular status: hemodynamically stable  Respiratory status: acceptable  Hydration status: stable

## 2023-03-06 RX ORDER — DOCUSATE SODIUM 100 MG/1
CAPSULE, LIQUID FILLED ORAL
Qty: 20 CAPSULE | Refills: 0 | Status: SHIPPED | OUTPATIENT
Start: 2023-03-06

## 2023-03-06 NOTE — TELEPHONE ENCOUNTER
Colace pending refills         Health Maintenance   Topic Date Due    COVID-19 Vaccine (1) Never done    Varicella vaccine (1 of 2 - 2-dose childhood series) Never done    Hepatitis C screen  Never done    Pap smear  Never done    HPV vaccine (3 - 3-dose series) 01/19/2020    Flu vaccine (1) 08/01/2022    Depression Screen  02/22/2024    DTaP/Tdap/Td vaccine (4 - Td or Tdap) 04/23/2028    Meningococcal (ACWY) vaccine  Completed    Pneumococcal 0-64 years Vaccine  Completed    HIV screen  Completed    Hepatitis A vaccine  Aged Out    Hib vaccine  Aged Out             (applicable per patient's age: Cancer Screenings, Depression Screening, Fall Risk Screening, Immunizations)    Hemoglobin A1C (%)   Date Value   06/24/2016 5.5     LDL Cholesterol (mg/dL)   Date Value   07/08/2016 94     AST (U/L)   Date Value   12/21/2016 23     ALT (U/L)   Date Value   12/21/2016 20     BUN (mg/dL)   Date Value   01/30/2019 12      (goal A1C is < 7)   (goal LDL is <100) need 30-50% reduction from baseline     BP Readings from Last 3 Encounters:   03/02/23 128/85   02/08/23 102/65   11/23/22 108/60    (goal /80)      All Future Testing planned in [facility]:  Lab Frequency Next Occurrence       Next Visit Date:  Future Appointments   Date Time Provider Heber Carreno   3/24/2023  3:45 PM Satnley Romo MD [facility]            Patient Active Problem List:     Chronic lower back pain     Allergic rhinitis     Mild persistent asthma without complication     Obesity (BMI 30-39. 9)     Urticaria     Superficial phlebitis and thrombophlebitis of right lower extremity     Vitamin D deficiency     Periumbilical abdominal pain     Intermittent asthma     Need for vaccination     Post-concussion headache     Viral URI     Periumbilical hernia     Gastroesophageal reflux disease without esophagitis

## 2023-03-08 ENCOUNTER — TELEPHONE (OUTPATIENT)
Dept: FAMILY MEDICINE CLINIC | Age: 28
End: 2023-03-08

## 2023-03-08 NOTE — TELEPHONE ENCOUNTER
Patient called office to schedule her follow up from [facility] surgery and during call, patient asked if her PCP could send something to her pharmacy for a cold. Patient states she has been sick since having surgery with productive cough, congestion and sinus pressure. Patient is asking if something can be sent to her pharmacy. Please advise.

## 2023-03-09 RX ORDER — GUAIFENESIN 600 MG/1
600 TABLET, EXTENDED RELEASE ORAL 2 TIMES DAILY PRN
Qty: 14 TABLET | Refills: 0 | Status: SHIPPED | OUTPATIENT
Start: 2023-03-09 | End: 2023-03-16

## 2023-03-09 NOTE — TELEPHONE ENCOUNTER
Jason Borges, I gave her Mucinex. Please have her call us if her symptoms do not improve in 3-5 days.   Thank you

## 2023-03-15 ENCOUNTER — OFFICE VISIT (OUTPATIENT)
Dept: SURGERY | Age: 28
End: 2023-03-15

## 2023-03-15 VITALS
SYSTOLIC BLOOD PRESSURE: 103 MMHG | HEART RATE: 77 BPM | WEIGHT: 256 LBS | DIASTOLIC BLOOD PRESSURE: 66 MMHG | BODY MASS INDEX: 40.1 KG/M2 | TEMPERATURE: 97.4 F

## 2023-03-15 DIAGNOSIS — Z48.89 ENCOUNTER FOR POSTOPERATIVE CARE: Primary | ICD-10-CM

## 2023-03-15 PROCEDURE — 99024 POSTOP FOLLOW-UP VISIT: CPT | Performed by: STUDENT IN AN ORGANIZED HEALTH CARE EDUCATION/TRAINING PROGRAM

## 2023-03-15 NOTE — LETTER
March 15, 2023       Abraham Monroe YOB: 1995 [address] Date of Visit:  3/15/2023       To Whom It May Concern: It is my medical opinion that Abraham Monroe may return to work on 3/16/2023 with the following restrictions: lifting/carrying not to exceed 8 lbs. , pushing/pulling not to exceed 8 lbs. .    If you have any questions or concerns, please don't hesitate to call.     Sincerely,        Soila Ching, DO

## 2023-03-15 NOTE — PROGRESS NOTES
[facility]    Patient's Name/Date of Birth: Alisson Goodwin / 1995 (89 [address] [age] y.o.)    Subjective:  Alisson Goodwin is a 89 [address] y.o. female that presents to the [facility] status post robotic assisted laparoscopic ventral hernia repair x2, fixation of prior mesh on 3/2/2023. Patient states that postoperatively she did develop an upper respiratory infection as she had a productive cough. She states that that has improved significantly but she still gets shortness of breath when she is walking. Patient states that she has minimal pain around her incisional wounds. No longer has the abdominal pain related to the hernia defect after its been closed. States that she is tolerating a regular diet and having normal bowel movements. Objective:  Vitals:    03/15/23 1038   BP: 103/66   Pulse: 77   Temp: 97.4 °F (36.3 °C)       General:Appears healthy. Alert; in no acute distress. Pleasant. Heart: regular rate and rhythm  Lungs: normal effort with symmetric rise and fall of chest wall  Abdomen: Soft, morbidly obese, nontender to palpation. Left-sided incisional wounds are completely healed. Skin: no rashes  Extremities: No edema present. Laboratory:  None to review    Pathology:  None to review    Assessment/Plan:  Patient Active Problem List   Diagnosis    Chronic lower back pain    Allergic rhinitis    Mild persistent asthma without complication    Obesity (BMI 30-39. 9)    Urticaria    Superficial phlebitis and thrombophlebitis of right lower extremity    Vitamin D deficiency    Periumbilical abdominal pain    Intermittent asthma    Need for vaccination    Post-concussion headache    Viral URI    Periumbilical hernia    Gastroesophageal reflux disease without esophagitis     Alisson Goodwin presents to the [facility] status post robotic assisted laparoscopic ventral hernia repair x2, fixation of prior mesh.   Patient is doing well postoperatively    We will follow up with Alisson Goodwin on an as-needed

## 2023-03-28 ENCOUNTER — TELEPHONE (OUTPATIENT)
Dept: FAMILY MEDICINE CLINIC | Age: 28
End: 2023-03-28

## 2023-04-06 ENCOUNTER — OFFICE VISIT (OUTPATIENT)
Dept: FAMILY MEDICINE CLINIC | Age: 28
End: 2023-04-06

## 2023-04-06 ENCOUNTER — HOSPITAL ENCOUNTER (OUTPATIENT)
Age: 28
Setting detail: SPECIMEN
Discharge: HOME | End: 2023-04-06

## 2023-04-06 VITALS
SYSTOLIC BLOOD PRESSURE: 106 MMHG | WEIGHT: 256.2 LBS | HEART RATE: 87 BPM | BODY MASS INDEX: 40.21 KG/M2 | OXYGEN SATURATION: 98 % | DIASTOLIC BLOOD PRESSURE: 71 MMHG | HEIGHT: 67 IN

## 2023-04-06 DIAGNOSIS — J45.40 MODERATE PERSISTENT ASTHMA WITHOUT COMPLICATION: Primary | ICD-10-CM

## 2023-04-06 DIAGNOSIS — R06.09 EXERTIONAL DYSPNEA: ICD-10-CM

## 2023-04-06 DIAGNOSIS — G47.33 OSA (OBSTRUCTIVE SLEEP APNEA): ICD-10-CM

## 2023-04-06 LAB — BNP SERPL-MCNC: <36 PG/ML

## 2023-04-06 RX ORDER — BUDESONIDE AND FORMOTEROL FUMARATE DIHYDRATE 160; 4.5 UG/1; UG/1
2 AEROSOL RESPIRATORY (INHALATION) 2 TIMES DAILY
Qty: 10.2 G | Refills: 3 | Status: SHIPPED | OUTPATIENT
Start: 2023-04-06

## 2023-04-06 ASSESSMENT — PATIENT HEALTH QUESTIONNAIRE - PHQ9
SUM OF ALL RESPONSES TO PHQ QUESTIONS 1-9: 0
SUM OF ALL RESPONSES TO PHQ9 QUESTIONS 1 & 2: 0
2. FEELING DOWN, DEPRESSED OR HOPELESS: 0
SUM OF ALL RESPONSES TO PHQ QUESTIONS 1-9: 0
SUM OF ALL RESPONSES TO PHQ QUESTIONS 1-9: 0
1. LITTLE INTEREST OR PLEASURE IN DOING THINGS: 0
SUM OF ALL RESPONSES TO PHQ QUESTIONS 1-9: 0

## 2023-04-06 NOTE — PROGRESS NOTES
[address] [facility] Medicine Residency Program - Outpatient Note      Subjective:      Cathy Rivera is a 33 y.o. female  presented to the office on 04/06/23 for asthma follow-up. She has asthma since childhood. She was using albuterol but reports her asthma is not controlled on just albuterol. She gets frequent flareups almost every day when she walks. She also reports multiple nighttime awakenings gasping for air, snores out loud. Denies peripheral edema. Lungs are clear on exam.  Also mentions having slight cardiac pain when she gets short of breath. She went recently to ER, chest x-ray was clean and troponins were fine. Her STOP-BANG score is 5. STOP BANG Questionnaire (High risk for CHELE >/= 3 \"yes\" answers)  Do you snore loudly (louder than talking or loud enough to hear through a closed door)? Yes  Do you often feel tired, fatigued, or sleepy during daytime? Yes  Has anyone observed you stop breathing during your sleep? Yes  Do you have a history of high blood pressure? No  BMI greater than 35? Yes  Age over 48 years? No  Neck circumference >40 cm? Yes  Male? No      Review of systems  Positive as mentioned above in subjective. All other systems negative. Objective:      Vitals:    04/06/23 0941   BP: 106/71   Pulse: 87   SpO2: 98%       Physical exam:  General Appearance - Alert and oriented x 3, morbidly obese  Lungs - Bilateral good air entry , no wheezes or rales  Cardiovascular - Regular rate and rhythm. No murmur  Skin -no peripheral edema    Assessment:        ICD-10-CM    1. Moderate persistent asthma without complication  V51.67 budesonide-formoterol (SYMBICORT) 160-4.5 MCG/ACT AERO     Spacer/Aero-Holding Chambers JOSE A      2. CHELE (obstructive sleep apnea)  G47.33 Baseline Diagnostic Sleep Study     Sleep Study with PAP Titration      3. Exertional dyspnea  R06.09 Brain Natriuretic Peptide          Plan:    I believe her asthma has advanced.   And she has

## 2023-04-06 NOTE — PROGRESS NOTES
Attending Physician Statement  I  have discussed the care of Nida Conner including pertinent history and exam findings with the resident. I agree with the assessment, plan and orders as documented by the resident. /71 (Site: Left Upper Arm, Position: Sitting, Cuff Size: Large Adult)   Pulse 87   Ht 5' 7\" (1.702 m)   Wt 256 lb 3.2 oz (116.2 kg)   LMP 03/27/2023   SpO2 98%   BMI 40.13 kg/m²    BP Readings from Last 3 Encounters:   04/06/23 106/71   03/15/23 103/66   03/02/23 128/85     Wt Readings from Last 3 Encounters:   04/06/23 256 lb 3.2 oz (116.2 kg)   03/15/23 256 lb (116.1 kg)   03/02/23 260 lb (117.9 kg)          Diagnosis Orders   1. Moderate persistent asthma without complication  budesonide-formoterol (SYMBICORT) 160-4.5 MCG/ACT AERO    Spacer/Aero-Holding Chambers JOSE A      2. CHELE (obstructive sleep apnea)  Baseline Diagnostic Sleep Study    Sleep Study with PAP Titration      3.  Exertional dyspnea  Brain Natriuretic Peptide          Qi Hernandez DO 4/6/2023 11:56 AM

## 2023-04-06 NOTE — PROGRESS NOTES
Writer didn't check out patient, Luzmaria Shi did give orders and AVS, Medical Assistant Tracie Ash checked out patient.

## 2023-04-06 NOTE — PATIENT INSTRUCTIONS
Thank you for letting us take care of you today. We hope all your questions were addressed. If a question was overlooked or something else comes to mind after you return home, please contact a member of your Care Team listed below. Your Care Team at [place] [address] is Team #3  Severo Bear, MD (Faculty)  Kailyn Navarro MD (Faculty  Dana Tony MD (Resident)  Eloise Clifford (Resident)   Stephanie Lou MD (Resident)  Alpa Muir., [place] Mcgraw., [place] Piper., KANDACE Coronel., Cody Garcia., Bakari Schmitz ([facility])  Anup Rothman, [address] (Clinical Practice Manager)  Carlos Soni [facility] (Clinical Pharmacist)     Office phone number: [phone]    If you need to get in right away due to illness, please be advised we have \"Same Day\" appointments available Monday-Friday. Please call us at [phone] option #3 to schedule your \"Same Day\" appointment.

## 2023-04-06 NOTE — PROGRESS NOTES
Visit Information    Have you changed or started any medications since your last visit including any over-the-counter medicines, vitamins, or herbal medicines? no   Have you stopped taking any of your medications? Is so, why? -  yes - see list  Are you having any side effects from any of your medications? - no    Have you seen any other physician or provider since your last visit?  no   Have you had any other diagnostic tests since your last visit?  no   Have you been seen in the emergency room and/or had an admission in a hospital since we last saw you? [facility] last week pt states   Have you had your routine dental cleaning in the past 6 months?  no     Do you have an active MyChart account? If no, what is the barrier?   Yes    Patient Care Team:  Tyrone Ovalle MD as PCP - General (Emergency Medicine)    Medical History Review  Past Medical, Family, and Social History reviewed and does not contribute to the patient presenting condition    Health Maintenance   Topic Date Due    COVID-19 Vaccine (1) Never done    Varicella vaccine (1 of 2 - 2-dose childhood series) Never done    Hepatitis C screen  Never done    Pap smear  Never done    HPV vaccine (3 - 3-dose series) 01/19/2020    Flu vaccine (Season Ended) 08/01/2023    Depression Screen  02/22/2024    DTaP/Tdap/Td vaccine (4 - Td or Tdap) 04/23/2028    Meningococcal (ACWY) vaccine  Completed    Pneumococcal 0-64 years Vaccine  Completed    HIV screen  Completed    Hepatitis A vaccine  Aged Out    Hib vaccine  Aged Out    Chlamydia/GC screen  Discontinued

## 2023-04-21 ENCOUNTER — HOSPITAL ENCOUNTER (OUTPATIENT)
Age: 28
Discharge: HOME | End: 2023-04-21

## 2023-04-21 LAB — RUBV IGG SER QL: 146.5 IU/ML

## 2023-04-21 PROCEDURE — 86765 RUBEOLA ANTIBODY: CPT

## 2023-04-21 PROCEDURE — 86762 RUBELLA ANTIBODY: CPT

## 2023-04-21 PROCEDURE — 86787 VARICELLA-ZOSTER ANTIBODY: CPT

## 2023-04-21 PROCEDURE — 86481 TB AG RESPONSE T-CELL SUSP: CPT

## 2023-04-21 PROCEDURE — 86735 MUMPS ANTIBODY: CPT

## 2023-04-24 LAB
MEASLES ANTIBODY IGG: 9.34
MUV IGG SER QL: 5.47
VZV IGG SER QL IA: 2.1

## 2023-04-25 LAB — T-SPOT TB TEST: NORMAL

## 2023-05-08 ENCOUNTER — HOSPITAL ENCOUNTER (OUTPATIENT)
Dept: SLEEP CENTER | Age: 28
Discharge: HOME | End: 2023-05-10
Payer: COMMERCIAL

## 2023-05-08 VITALS — BODY MASS INDEX: 40.18 KG/M2 | WEIGHT: 256 LBS | HEIGHT: 67 IN

## 2023-05-08 DIAGNOSIS — G47.33 OSA (OBSTRUCTIVE SLEEP APNEA): ICD-10-CM

## 2023-05-08 PROCEDURE — 95810 POLYSOM 6/> YRS 4/> PARAM: CPT

## 2023-05-19 ENCOUNTER — OFFICE VISIT (OUTPATIENT)
Dept: FAMILY MEDICINE CLINIC | Age: 28
End: 2023-05-19
Payer: COMMERCIAL

## 2023-05-19 ENCOUNTER — TELEPHONE (OUTPATIENT)
Dept: FAMILY MEDICINE CLINIC | Age: 28
End: 2023-05-19

## 2023-05-19 VITALS
BODY MASS INDEX: 39.31 KG/M2 | WEIGHT: 251 LBS | HEART RATE: 79 BPM | DIASTOLIC BLOOD PRESSURE: 70 MMHG | SYSTOLIC BLOOD PRESSURE: 108 MMHG

## 2023-05-19 DIAGNOSIS — J45.40 MODERATE PERSISTENT ASTHMA WITHOUT COMPLICATION: Primary | ICD-10-CM

## 2023-05-19 DIAGNOSIS — J45.30 MILD PERSISTENT ASTHMA WITHOUT COMPLICATION: Primary | ICD-10-CM

## 2023-05-19 PROCEDURE — 99213 OFFICE O/P EST LOW 20 MIN: CPT

## 2023-05-19 ASSESSMENT — ENCOUNTER SYMPTOMS
SHORTNESS OF BREATH: 0
COUGH: 0
DIARRHEA: 0
SORE THROAT: 0
BACK PAIN: 0
RHINORRHEA: 0
NAUSEA: 0
ABDOMINAL PAIN: 0

## 2023-05-19 NOTE — TELEPHONE ENCOUNTER
Medication Management Service ([address] [place] [address])  [facility]  [phone]     CLINICAL PHARMACY NOTE:      Spoke with patient prior to her appointment with Dr. Riaz Morris DO on 5/19/2023. Patient was prescribed Symbicort 160-4.5 mcg/actuation 2 inhalations twice daily  at her last PCP appointment  to use in addition to her albuterol inhaler 2 inhalations every 6 hours as needed for wheezing for management of her asthma. Patient reports that she has been using her Symbicort with the spacer and expressed that she feels her symptoms have improved. She additionally stated that she likes the spacer device as this makes it easier to use the inhaler. Counseled patient on administration techniques such as to breathe in deeply and slowly as well as emphasized the importance of taking the Symbicort as prescribed everyday as it is a maintenance inhaler and to use the albuterol inhaler only on an as needed basis for wheezing. The patient states that she has not had to use her albuterol inhaler lately.      Toshia Freeman PharmDERRICK   5/19/2023 9:59 AM

## 2023-05-19 NOTE — PROGRESS NOTES
Attending Physician Statement  I have discussed the care of JanaJonesincluding pertinent history and exam findings,  with the resident. I have reviewed the key elements of all parts of the encounter with the resident. I agree with the assessment, plan and orders as documented by the resident. (GE Modifier)    Mild persistent Asthma- doing better on Symbicort/PFT/CHELE  Morbid Obesity-  Life Style Modification- Diet and Exercise discussed. Schedule Pap next visit.
coming in in 1 month for a Pap smear. Her last Pap smear was 2017, normal findings at that time. Requested Prescriptions      No prescriptions requested or ordered in this encounter       There are no discontinued medications. Boris Diehl received counseling on the following healthy behaviors: nutrition, exercise and medication adherence    Discussed use,benefit, and side effects of prescribed medications. Barriers to medication compliance addressed. All patient questions answered. Pt voiced understanding. Return in about 4 weeks (around 6/16/2023) for pap smear . Disclaimer: Some orall of this note was transcribed using voice-recognition software. This may cause typographical errors occasionally. Although all effort is made to fix these errors, please do not hesitate to contact our office if there Malia Elder concern with the understanding of this note.

## 2023-05-23 LAB — STATUS: NORMAL

## 2023-08-05 ENCOUNTER — APPOINTMENT (OUTPATIENT)
Dept: GENERAL RADIOLOGY | Age: 28
End: 2023-08-05
Payer: COMMERCIAL

## 2023-08-05 ENCOUNTER — HOSPITAL ENCOUNTER (EMERGENCY)
Age: 28
Discharge: HOME | End: 2023-08-05
Attending: EMERGENCY MEDICINE
Payer: COMMERCIAL

## 2023-08-05 ENCOUNTER — TELEPHONE (OUTPATIENT)
Dept: FAMILY MEDICINE CLINIC | Age: 28
End: 2023-08-05

## 2023-08-05 VITALS
WEIGHT: 248.68 LBS | TEMPERATURE: 98.1 F | SYSTOLIC BLOOD PRESSURE: 138 MMHG | RESPIRATION RATE: 16 BRPM | HEART RATE: 85 BPM | DIASTOLIC BLOOD PRESSURE: 123 MMHG | OXYGEN SATURATION: 95 % | BODY MASS INDEX: 38.95 KG/M2

## 2023-08-05 DIAGNOSIS — M25.562 ACUTE PAIN OF LEFT KNEE: Primary | ICD-10-CM

## 2023-08-05 PROCEDURE — 99283 EMERGENCY DEPT VISIT LOW MDM: CPT

## 2023-08-05 PROCEDURE — 73562 X-RAY EXAM OF KNEE 3: CPT

## 2023-08-05 PROCEDURE — 6370000000 HC RX 637 (ALT 250 FOR IP): Performed by: STUDENT IN AN ORGANIZED HEALTH CARE EDUCATION/TRAINING PROGRAM

## 2023-08-05 RX ORDER — ACETAMINOPHEN 500 MG
1000 TABLET ORAL ONCE
Status: COMPLETED | OUTPATIENT
Start: 2023-08-05 | End: 2023-08-05

## 2023-08-05 RX ORDER — IBUPROFEN 800 MG/1
800 TABLET ORAL ONCE
Status: COMPLETED | OUTPATIENT
Start: 2023-08-05 | End: 2023-08-05

## 2023-08-05 RX ADMIN — DICLOFENAC SODIUM 2 G: 10 GEL TOPICAL at 11:32

## 2023-08-05 RX ADMIN — ACETAMINOPHEN 1000 MG: 500 TABLET ORAL at 11:12

## 2023-08-05 RX ADMIN — IBUPROFEN 800 MG: 800 TABLET ORAL at 11:12

## 2023-08-05 ASSESSMENT — PAIN DESCRIPTION - LOCATION: LOCATION: KNEE

## 2023-08-05 ASSESSMENT — PAIN DESCRIPTION - ORIENTATION: ORIENTATION: LEFT

## 2023-08-05 ASSESSMENT — PAIN - FUNCTIONAL ASSESSMENT: PAIN_FUNCTIONAL_ASSESSMENT: 0-10

## 2023-08-05 ASSESSMENT — PAIN SCALES - GENERAL
PAINLEVEL_OUTOF10: 7
PAINLEVEL_OUTOF10: 7

## 2023-08-05 NOTE — TELEPHONE ENCOUNTER
Received a health link that patient fell on her knee yesterday and is questioning if she should go to the emergency room for further evaluation. Patient states her knee is swollen, she cannot bend the knee more than 90 degrees. Patient yesterday could bear weight on it and has been walking on it all day, but she has tenderness along the fibular head, along with the fact that she is unable to flex the knee to 90 degrees, according to the Harmon knee rules she meets criteria for further imaging. Advised patient she should go to the ED for further evaluation, patient also has a history of damage to that knee in the past.  Patient agreed.     [facility] Medicine Resident, PGY 3    8/5/2023  10:34 AM

## 2023-08-05 NOTE — ED PROVIDER NOTES
[facility] ED  Emergency Department Encounter  Emergency Medicine Resident     Pt Sekou Hester  MRN: [id number]  [address] 1995  Date of evaluation: 23  PCP:  Vanna Koyanagi, MD  Note Started: 11:01 AM EDT      CHIEF COMPLAINT       Chief Complaint   Patient presents with    Knee Pain     Left knee        HISTORY OF PRESENT ILLNESS  (Location/Symptom, Timing/Onset, Context/Setting, Quality, Duration, Modifying Factors, Severity.)      Joslyn Kent is a 33 y.o. female who presents with left knee pain, patient fell at the gym yesterday, she did not hit her head she did lose consciousness, she is complaining of worsening left knee swelling today. Patient was able to ambulate immediately following the injury. No other medical concerns no other medical complaints today. Patient is able to walk, she states that she is having some trouble due to the pain in her left knee. PAST MEDICAL / SURGICAL / SOCIAL / FAMILY HISTORY      has a past medical history of Asthma. has a past surgical history that includes Abdominal hernia repair ();  section (); and Umbilical hernia repair (N/A, 3/2/2023).       Social History     Socioeconomic History    Marital status: Single     Spouse name: Not on file    Number of children: Not on file    Years of education: Not on file    Highest education level: Not on file   Occupational History    Not on file   Tobacco Use    Smoking status: Never    Smokeless tobacco: Never   Substance and Sexual Activity    Alcohol use: No    Drug use: No    Sexual activity: Not on file   Other Topics Concern    Not on file   Social History Narrative    Not on file     Social Determinants of Health     Financial Resource Strain: Low Risk     Difficulty of Paying Living Expenses: Not very hard   Food Insecurity: No Food Insecurity    Worried About Running Out of Food in the Last Year: Never true    Ran Out of Food in the Last Year: Never true   Transportation voice recognition program.  Efforts were made to edit the dictations but occasionally words are mis-transcribed.)       Cholo Coronado DO  Resident  08/05/23 9449

## 2023-08-05 NOTE — ED PROVIDER NOTES
completed with a voice recognition program.  Efforts were made to edit the dictations but occasionally words are mis-transcribed.)    Luda Booker.  Jose Munoz MD, [facility]  Attending Emergency Medicine Physician        Hope Veronica MD  08/05/23 1297

## 2023-08-08 ENCOUNTER — HOSPITAL ENCOUNTER (OUTPATIENT)
Age: 28
Setting detail: SPECIMEN
Discharge: HOME | End: 2023-08-08

## 2023-08-09 LAB
C TRACH DNA SPEC QL PROBE+SIG AMP: NEGATIVE
CANDIDA SPECIES: NEGATIVE
GARDNERELLA VAGINALIS: POSITIVE
N GONORRHOEA DNA SPEC QL PROBE+SIG AMP: NEGATIVE
SOURCE: ABNORMAL
SPECIMEN DESCRIPTION: NORMAL
TRICHOMONAS: NEGATIVE

## 2023-09-09 ENCOUNTER — HOSPITAL ENCOUNTER (EMERGENCY)
Age: 28
Discharge: HOME | End: 2023-09-09
Attending: EMERGENCY MEDICINE
Payer: COMMERCIAL

## 2023-09-09 VITALS
OXYGEN SATURATION: 99 % | BODY MASS INDEX: 39.98 KG/M2 | DIASTOLIC BLOOD PRESSURE: 77 MMHG | SYSTOLIC BLOOD PRESSURE: 121 MMHG | RESPIRATION RATE: 16 BRPM | TEMPERATURE: 97.4 F | HEART RATE: 83 BPM | WEIGHT: 255.29 LBS

## 2023-09-09 DIAGNOSIS — Z34.90 PREGNANCY, UNSPECIFIED GESTATIONAL AGE: ICD-10-CM

## 2023-09-09 DIAGNOSIS — R53.1 GENERAL WEAKNESS: Primary | ICD-10-CM

## 2023-09-09 LAB
ANION GAP SERPL CALCULATED.3IONS-SCNC: 11 MMOL/L (ref 9–17)
BASOPHILS # BLD: 0.05 K/UL (ref 0–0.2)
BASOPHILS NFR BLD: 1 % (ref 0–2)
BUN SERPL-MCNC: 7 MG/DL (ref 6–20)
CALCIUM SERPL-MCNC: 8.5 MG/DL (ref 8.6–10.4)
CHLORIDE SERPL-SCNC: 103 MMOL/L (ref 98–107)
CO2 SERPL-SCNC: 20 MMOL/L (ref 20–31)
CREAT SERPL-MCNC: 0.5 MG/DL (ref 0.5–0.9)
EOSINOPHIL # BLD: 0.05 K/UL (ref 0–0.44)
EOSINOPHILS RELATIVE PERCENT: 1 % (ref 1–4)
ERYTHROCYTE [DISTWIDTH] IN BLOOD BY AUTOMATED COUNT: 15.9 % (ref 11.8–14.4)
GFR SERPL CREATININE-BSD FRML MDRD: >60 ML/MIN/1.73M2
GLUCOSE SERPL-MCNC: 86 MG/DL (ref 70–99)
HCT VFR BLD AUTO: 38.1 % (ref 36.3–47.1)
HGB BLD-MCNC: 12.1 G/DL (ref 11.9–15.1)
IMM GRANULOCYTES # BLD AUTO: 0.03 K/UL (ref 0–0.3)
IMM GRANULOCYTES NFR BLD: 0 %
LYMPHOCYTES NFR BLD: 2.6 K/UL (ref 1.1–3.7)
LYMPHOCYTES RELATIVE PERCENT: 26 % (ref 24–43)
MAGNESIUM SERPL-MCNC: 1.9 MG/DL (ref 1.6–2.6)
MCH RBC QN AUTO: 24 PG (ref 25.2–33.5)
MCHC RBC AUTO-ENTMCNC: 31.8 G/DL (ref 28.4–34.8)
MCV RBC AUTO: 75.6 FL (ref 82.6–102.9)
MONOCYTES NFR BLD: 0.9 K/UL (ref 0.1–1.2)
MONOCYTES NFR BLD: 9 % (ref 3–12)
NEUTROPHILS NFR BLD: 63 % (ref 36–65)
NEUTS SEG NFR BLD: 6.4 K/UL (ref 1.5–8.1)
NRBC BLD-RTO: 0 PER 100 WBC
PLATELET # BLD AUTO: 284 K/UL (ref 138–453)
PMV BLD AUTO: 9.8 FL (ref 8.1–13.5)
POTASSIUM SERPL-SCNC: 3.7 MMOL/L (ref 3.7–5.3)
RBC # BLD AUTO: 5.04 M/UL (ref 3.95–5.11)
RBC # BLD: ABNORMAL 10*6/UL
SARS-COV-2 RDRP RESP QL NAA+PROBE: NOT DETECTED
SODIUM SERPL-SCNC: 134 MMOL/L (ref 135–144)
SPECIMEN DESCRIPTION: NORMAL
WBC OTHER # BLD: 10 K/UL (ref 3.5–11.3)

## 2023-09-09 PROCEDURE — 85025 COMPLETE CBC W/AUTO DIFF WBC: CPT

## 2023-09-09 PROCEDURE — 87635 SARS-COV-2 COVID-19 AMP PRB: CPT

## 2023-09-09 PROCEDURE — 6360000002 HC RX W HCPCS: Performed by: EMERGENCY MEDICINE

## 2023-09-09 PROCEDURE — 99284 EMERGENCY DEPT VISIT MOD MDM: CPT | Performed by: EMERGENCY MEDICINE

## 2023-09-09 PROCEDURE — 83735 ASSAY OF MAGNESIUM: CPT

## 2023-09-09 PROCEDURE — 2580000003 HC RX 258: Performed by: EMERGENCY MEDICINE

## 2023-09-09 PROCEDURE — 96374 THER/PROPH/DIAG INJ IV PUSH: CPT | Performed by: EMERGENCY MEDICINE

## 2023-09-09 PROCEDURE — 80048 BASIC METABOLIC PNL TOTAL CA: CPT

## 2023-09-09 RX ORDER — ONDANSETRON 2 MG/ML
4 INJECTION INTRAMUSCULAR; INTRAVENOUS ONCE
Status: COMPLETED | OUTPATIENT
Start: 2023-09-09 | End: 2023-09-09

## 2023-09-09 RX ORDER — 0.9 % SODIUM CHLORIDE 0.9 %
1000 INTRAVENOUS SOLUTION INTRAVENOUS ONCE
Status: COMPLETED | OUTPATIENT
Start: 2023-09-09 | End: 2023-09-09

## 2023-09-09 RX ADMIN — SODIUM CHLORIDE 1000 ML: 9 INJECTION, SOLUTION INTRAVENOUS at 17:38

## 2023-09-09 RX ADMIN — ONDANSETRON 4 MG: 2 INJECTION INTRAMUSCULAR; INTRAVENOUS at 17:38

## 2023-09-09 ASSESSMENT — ENCOUNTER SYMPTOMS
VOMITING: 0
BACK PAIN: 0
DIARRHEA: 0
ABDOMINAL PAIN: 0
COLOR CHANGE: 0
SHORTNESS OF BREATH: 0
COUGH: 0
CHEST TIGHTNESS: 0
NAUSEA: 0
TROUBLE SWALLOWING: 0

## 2023-09-09 NOTE — ED PROVIDER NOTES
doesn't feel well. \" Will get labs, perfom bedside ultrasound. [XP]   8033 Bedside ultrasound shows definitive intrauterine pregnancy, fetal heart present qualitatively, able to visualize cardiac movement however unable to obtain rate given early stage of pregnancy. Appears adequate. Fallopian tubes visualized, no evidence of obvious ectopic. Patient is not having any abdominal pain she is not having any vaginal bleeding or vaginal discharge. Patient's vitals are within normal limits. Will get basic labs and COVID swab. [JE]   6268 Initial labs grossly unremarkable, sodium marginally low at 134. [KK]   1839 Negative work-up. [BS]   0414 Patient symptomatically improved after fluids, nausea meds. Patient tested negative for COVID, lab work grossly unremarkable. Vital signs reviewed at discharge and were within normal limits for the patient. Patient was symptomatically improved and had no new complaints. Patient verbalized understanding of the plan. Patient was given follow up instructions and ER return precautions. Discharge paperwork was reviewed with the patient. Patient is appropriate for discharge and outpatient follow up. [KK]      ED Course User Index  [KK] Melonie Marinelli DO       FINAL IMPRESSION      1. General weakness    2.  Pregnancy, unspecified gestational age          DISPOSITION / PLAN     DISPOSITION Decision To Discharge 09/09/2023 06:54:20 PM      PATIENT REFERRED TO:  [facility]  [address] [address] [phone]  Go to       [address] [place] [address] [address]  [address] [place] [id number]          DISCHARGE MEDICATIONS:  Discharge Medication List as of 9/9/2023  6:56 PM          Areli Mcgraw DO  Emergency Medicine Resident    (Please note that portions of this note were completed with a voice recognition program.  Efforts were made to edit the dictations but occasionally words are mis-transcribed.)        Melonie Marinelli DO  Resident  09/09/23 2025

## 2023-09-09 NOTE — ED TRIAGE NOTES
Pt to ED for fatigue and shakiness that started today. Pt states she can not describe how she is feeling she just feels unwell. States she has not had energy. Pt states past pregnancies but this is the first time she has felt like this.

## 2023-09-09 NOTE — DISCHARGE INSTRUCTIONS
You have been seen in the ER today for generalized weakness in pregnancy. Your lab work was unremarkable, you will have to follow-up with your OB/GYN within 1 week. Please return to the ER if you have any additional concerns. If you begin to experience any symptoms such as chest pain shortness of breath nausea vomiting dizziness drowsiness abdominal pain loss of consciousness or any other symptoms you find concerning please return to the ED for follow-up evaluation. If you have been given pain medication please take them only as prescribed. Do not take more medication than prescribed at any given time. Please follow-up with your primary care provider within 3-5 days for continued care, sooner if you have concerns.

## 2024-02-25 ENCOUNTER — HOSPITAL ENCOUNTER (EMERGENCY)
Age: 29
Discharge: HOME | End: 2024-02-25
Attending: EMERGENCY MEDICINE
Payer: MEDICAID

## 2024-02-25 ENCOUNTER — HOSPITAL ENCOUNTER (OUTPATIENT)
Age: 29
Discharge: HOME | End: 2024-02-25
Attending: OBSTETRICS & GYNECOLOGY | Admitting: OBSTETRICS & GYNECOLOGY
Payer: MEDICAID

## 2024-02-25 VITALS
HEART RATE: 108 BPM | RESPIRATION RATE: 18 BRPM | DIASTOLIC BLOOD PRESSURE: 81 MMHG | TEMPERATURE: 98.3 F | OXYGEN SATURATION: 99 % | SYSTOLIC BLOOD PRESSURE: 130 MMHG

## 2024-02-25 VITALS
HEART RATE: 94 BPM | RESPIRATION RATE: 20 BRPM | SYSTOLIC BLOOD PRESSURE: 115 MMHG | TEMPERATURE: 98.2 F | DIASTOLIC BLOOD PRESSURE: 66 MMHG

## 2024-02-25 DIAGNOSIS — S01.511A LIP LACERATION, INITIAL ENCOUNTER: Primary | ICD-10-CM

## 2024-02-25 PROBLEM — O09.899 RUBELLA NON-IMMUNE STATUS, ANTEPARTUM: Status: ACTIVE | Noted: 2024-02-25

## 2024-02-25 PROBLEM — Z28.39 RUBELLA NON-IMMUNE STATUS, ANTEPARTUM: Status: ACTIVE | Noted: 2024-02-25

## 2024-02-25 PROBLEM — Z3A.32 32 WEEKS GESTATION OF PREGNANCY: Status: ACTIVE | Noted: 2024-02-25

## 2024-02-25 PROCEDURE — 6360000002 HC RX W HCPCS: Performed by: EMERGENCY MEDICINE

## 2024-02-25 PROCEDURE — 99213 OFFICE O/P EST LOW 20 MIN: CPT

## 2024-02-25 PROCEDURE — 90471 IMMUNIZATION ADMIN: CPT | Performed by: EMERGENCY MEDICINE

## 2024-02-25 PROCEDURE — 2500000003 HC RX 250 WO HCPCS: Performed by: EMERGENCY MEDICINE

## 2024-02-25 PROCEDURE — 90715 TDAP VACCINE 7 YRS/> IM: CPT | Performed by: EMERGENCY MEDICINE

## 2024-02-25 PROCEDURE — 12013 RPR F/E/E/N/L/M 2.6-5.0 CM: CPT

## 2024-02-25 PROCEDURE — 99284 EMERGENCY DEPT VISIT MOD MDM: CPT

## 2024-02-25 RX ORDER — ONDANSETRON 2 MG/ML
4 INJECTION INTRAMUSCULAR; INTRAVENOUS EVERY 6 HOURS PRN
Status: DISCONTINUED | OUTPATIENT
Start: 2024-02-25 | End: 2024-02-25 | Stop reason: HOSPADM

## 2024-02-25 RX ORDER — ACETAMINOPHEN 500 MG
1000 TABLET ORAL EVERY 8 HOURS SCHEDULED
Status: DISCONTINUED | OUTPATIENT
Start: 2024-02-25 | End: 2024-02-25 | Stop reason: HOSPADM

## 2024-02-25 RX ORDER — ONDANSETRON 4 MG/1
4 TABLET, ORALLY DISINTEGRATING ORAL EVERY 8 HOURS PRN
Status: DISCONTINUED | OUTPATIENT
Start: 2024-02-25 | End: 2024-02-25 | Stop reason: HOSPADM

## 2024-02-25 RX ORDER — LIDOCAINE HYDROCHLORIDE 10 MG/ML
20 INJECTION, SOLUTION INFILTRATION; PERINEURAL ONCE
Status: COMPLETED | OUTPATIENT
Start: 2024-02-25 | End: 2024-02-25

## 2024-02-25 RX ORDER — AMOXICILLIN AND CLAVULANATE POTASSIUM 875; 125 MG/1; MG/1
1 TABLET, FILM COATED ORAL 2 TIMES DAILY
Qty: 14 TABLET | Refills: 0 | Status: SHIPPED | OUTPATIENT
Start: 2024-02-25 | End: 2024-03-03

## 2024-02-25 RX ADMIN — LIDOCAINE HYDROCHLORIDE 20 ML: 10 INJECTION, SOLUTION INFILTRATION; PERINEURAL at 18:04

## 2024-02-25 RX ADMIN — TETANUS TOXOID, REDUCED DIPHTHERIA TOXOID AND ACELLULAR PERTUSSIS VACCINE, ADSORBED 0.5 ML: 5; 2.5; 8; 8; 2.5 SUSPENSION INTRAMUSCULAR at 17:59

## 2024-02-25 ASSESSMENT — LIFESTYLE VARIABLES: HOW OFTEN DO YOU HAVE A DRINK CONTAINING ALCOHOL: NEVER

## 2024-02-25 NOTE — PROGRESS NOTES
TESTING NOTE    Chica Dickinson is a 28 y.o. female  at 32w5d    The patient was seen and examined. She is here today for  testing after a lip laceration from a bite. Assessed in the ED and received Tetanus vaccine and was discharged with Augmentin for antibiotic prophylaxis. She has no OB complaints and denies any abdominal trauma. See media image of wound of lower lip.           The baby is moving well and she denies any complaints.    Patient Active Problem List   Diagnosis    Chronic lower back pain    Allergic rhinitis    Mild persistent asthma without complication    Obesity (BMI 30-39.9)    Urticaria    Superficial phlebitis and thrombophlebitis of right lower extremity    Vitamin D deficiency    Periumbilical abdominal pain    Intermittent asthma    Need for vaccination    Post-concussion headache    Viral URI    Periumbilical hernia    Gastroesophageal reflux disease without esophagitis    Moderate persistent asthma without complication    32 weeks gestation of pregnancy     Vitals:  Vitals:    24 1833   BP: 115/66   Pulse: 94   Resp: 20   Temp: 98.2 °F (36.8 °C)     NST:   Fetal heart rate baseline:  135 , moderate variability, accelerations present, absent decelerations     The tracing has been reviewed and is considered reactive.    Assessment/Plan:  Chica Dickinson is a 28 y.o. female  at 32w5d  NST reactive   - The patient will continue with her scheduled office appointments. Next appointment on 24.    - She will continue with her  testing as scheduled.       - Signs and Symptoms of  labor precautions were reviewed.   - She was counseled on adequate hydration prior to discharge   - The patient was instructed on fetal kick counts.  Dr. Blood has reviewed this NST and agrees with the above stated assessment and plan.   Discussed results with Dr. Blood who is agreeable to the above plan.     Barby Hogan MD  Ob/Gyn Resident   2024, 7:36 PM

## 2024-02-25 NOTE — DISCHARGE INSTR - COC
Continuity of Care Form    Patient Name: Chica Dickinson   :  1995  MRN:  [id number]    Admit date:  2024  Discharge date:  ***    Code Status Order: No Order   Advance Directives:     Admitting Physician:  No admitting provider for patient encounter.  PCP: Lucy Cooper MD    Discharging Nurse: ***  Discharging Hospital Unit/Room#: 39/39  Discharging Unit Phone Number: ***    Emergency Contact:   No emergency contact information on file.    Past Surgical History:  Past Surgical History:   Procedure Laterality Date    ABDOMINAL HERNIA REPAIR  2017     SECTION  2018    UMBILICAL HERNIA REPAIR N/A 3/2/2023    Robotic assisted laparoscopic ventral hernia repair x2, fixation of prior mesh, TAP block performed by Jose Nevarez IV,  at [facility] OR       Immunization History:   Immunization History   Administered Date(s) Administered    HPV Quadrivalent (Gardasil) 2012    HPV, GARDASIL 9, (age 9y-45y), IM, 0.5mL 2019    Influenza Virus Vaccine 2012    Influenza, AFLURIA (age 3 yrs+), FLUZONE, (age 6 mo+), MDV, 0.5mL 2016    Influenza, FLUARIX, FLULAVAL, FLUZONE (age 6 mo+) AND AFLURIA, (age 3 y+), PF, 0.5mL 2018, 2019, 2019    Meningococcal ACWY, MENVEO (MenACWY-CRM), (age 2m-55y), IM, 0.5mL 2012    Pneumococcal, PPSV23, PNEUMOVAX 23, (age 2y+), SC/IM, 0.5mL 2016    TDaP, ADACEL (age 10y-64y), BOOSTRIX (age 10y+), IM, 0.5mL 2012, 2016, 2018, 2024       Active Problems:  Patient Active Problem List   Diagnosis Code    Chronic lower back pain M54.50, G89.29    Allergic rhinitis J30.9    Mild persistent asthma without complication J45.30    Obesity (BMI 30-39.9) E66.9    Urticaria L50.9    Superficial phlebitis and thrombophlebitis of right lower extremity I80.01    Vitamin D deficiency E55.9    Periumbilical abdominal pain R10.33    Intermittent asthma J45.20    Need for vaccination Z23    Post-concussion headache G44.309    Viral
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
## 2024-02-25 NOTE — DISCHARGE INSTRUCTIONS
You are seen in the ER today following your bite injury.  We did repair this with sutures.  These will need to be removed in 5 days.  Please return to the emergency department or your primary care provider if they are comfortable removing sutures.  We did update your tetanus shot as this is usually required after bite injuries.  Additionally, due to the nature of this injury, we also will send you home with a prescription for an antibiotic called Augmentin.  Please take this twice a day for 7 days until completion.  Please follow-up with your OB/GYN within the next 3 days as well as her primary care provider.  Additionally, we will send you up to the OB/GYN floor for tracing of both you and baby.  Please return to the emergency department if you develop abnormal vaginal bleeding or discharge, leakage of fluid, abdominal pain, or any other concerning symptoms.    PLEASE RETURN TO THE EMERGENCY DEPARTMENT IMMEDIATELY if you develop any concerning symptoms such as: chest pain, shortness of breath, feeling like your heart is racing, high fever not relieved by acetaminophen (Tylenol), chills, persistent nausea and/or vomiting, loss of consciousness, numbness, weakness or tingling in the arms or legs or change in color of the extremities, changes in mental status, persistent or severe headache, blurry vision, loss of bladder / bowel control, unable to follow up with your physician, or other any other care or concern.

## 2024-02-26 ASSESSMENT — ENCOUNTER SYMPTOMS
ABDOMINAL PAIN: 0
VOMITING: 0
SHORTNESS OF BREATH: 0
NAUSEA: 0
COUGH: 0

## 2024-02-26 NOTE — FLOWSHEET NOTE
Patient was given discharge instructions and verbalized understanding. Patient denied having any questions at this time.

## 2024-03-02 ENCOUNTER — OFFICE VISIT (OUTPATIENT)
Dept: FAMILY MEDICINE CLINIC | Age: 29
End: 2024-03-02

## 2024-03-02 VITALS
TEMPERATURE: 98.4 F | OXYGEN SATURATION: 97 % | SYSTOLIC BLOOD PRESSURE: 102 MMHG | HEART RATE: 100 BPM | DIASTOLIC BLOOD PRESSURE: 70 MMHG

## 2024-03-02 DIAGNOSIS — S01.511D LIP LACERATION, SUBSEQUENT ENCOUNTER: Primary | ICD-10-CM

## 2024-03-02 RX ORDER — MELATONIN
COMMUNITY
Start: 2024-03-01

## 2024-03-02 NOTE — PROGRESS NOTES
heard.  Pulmonary:      Effort: Pulmonary effort is normal. No respiratory distress.      Breath sounds: Normal breath sounds. No wheezing.   Skin:     Comments: Right lower lip has well-healed wound with 4 sutures in place.  Sensation intact to light touch.  No bleeding or discharge.  Slight tenderness to touch.   Neurological:      Mental Status: She is alert.   Psychiatric:         Mood and Affect: Mood normal.         Behavior: Behavior normal.         Thought Content: Thought content normal.         Judgment: Judgment normal.         Assessment & Plan:       Diagnosis Orders   1. Lip laceration, subsequent encounter   - [place] REMOVAL OF SUTURES      Sutures successfully removed.  Recommended keeping area moisturized with petroleum jelly.  Educated on signs/symptoms of infection.  She will complete the course of Augmentin as prescribed in the ER.    Call or return to clinic prn if these symptoms worsen or fail to improve as anticipated.  I have reviewed the instructions with the patient, answering all questions to their satisfaction.    Electronically signed by Kimmy De La Garza MD on 3/2/2024 at 12:35 PM
